# Patient Record
Sex: MALE | Race: BLACK OR AFRICAN AMERICAN | NOT HISPANIC OR LATINO
[De-identification: names, ages, dates, MRNs, and addresses within clinical notes are randomized per-mention and may not be internally consistent; named-entity substitution may affect disease eponyms.]

---

## 2022-02-15 PROBLEM — Z00.00 ENCOUNTER FOR PREVENTIVE HEALTH EXAMINATION: Status: ACTIVE | Noted: 2022-02-15

## 2022-02-17 ENCOUNTER — APPOINTMENT (OUTPATIENT)
Dept: UROLOGY | Facility: CLINIC | Age: 57
End: 2022-02-17
Payer: COMMERCIAL

## 2022-02-17 VITALS
BODY MASS INDEX: 29.41 KG/M2 | TEMPERATURE: 97.2 F | HEART RATE: 93 BPM | SYSTOLIC BLOOD PRESSURE: 130 MMHG | WEIGHT: 183 LBS | DIASTOLIC BLOOD PRESSURE: 88 MMHG | HEIGHT: 66 IN

## 2022-02-17 PROCEDURE — 99204 OFFICE O/P NEW MOD 45 MIN: CPT

## 2022-02-17 NOTE — HISTORY OF PRESENT ILLNESS
[FreeTextEntry1] : Dr. Addy Barreto\par [address] [address]\par \par \par Mr. Sylvester presents today for elevated PSA.  He is an overall healthy AA male with no comorbidities.  He does have a positive family history for prostate cancer.  His father and brother who are both alive and well were diagnosed with prostate cancer.  Mr. Sylvester is unsure of the treatment they received in regards to there CaP\par \par PSA trend:\par ~2 in 2020 (no records available)\par 4.48-1/24/22\par \par Does report nocturia x 3-4\par Occasional weak stream, frequency, occasional sense of incomplete emptying \par PVR today is 11 cc\par \par Surgical Hx: appendectomy 2021, left knee meniscus surgery 2011\par Social Hx: 1 cigarette/day, rare ETOH use, college , plays guitar, no children, \par Family Hx: Brother and father with CaP, both alive\par \par

## 2022-02-17 NOTE — PHYSICAL EXAM
[General Appearance - Well Developed] : well developed [General Appearance - Well Nourished] : well nourished [Normal Appearance] : normal appearance [Well Groomed] : well groomed [General Appearance - In No Acute Distress] : no acute distress [Edema] : no peripheral edema [] : no respiratory distress [Respiration, Rhythm And Depth] : normal respiratory rhythm and effort [Exaggerated Use Of Accessory Muscles For Inspiration] : no accessory muscle use [Abdomen Soft] : soft [Abdomen Tenderness] : non-tender [Costovertebral Angle Tenderness] : no ~M costovertebral angle tenderness [Urethral Meatus] : meatus normal [Penis Abnormality] : normal uncircumcised penis [Urinary Bladder Findings] : the bladder was normal on palpation [Scrotum] : the scrotum was normal [Epididymis] : the epididymides were normal [Testes Tenderness] : no tenderness of the testes [Testes Mass (___cm)] : there were no testicular masses [Prostate Tenderness] : the prostate was not tender [No Prostate Nodules] : no prostate nodules [No Focal Deficits] : no focal deficits [Oriented To Time, Place, And Person] : oriented to person, place, and time [Affect] : the affect was normal [Mood] : the mood was normal [Not Anxious] : not anxious [No Palpable Adenopathy] : no palpable adenopathy IV intact

## 2022-02-17 NOTE — ASSESSMENT
[FreeTextEntry1] : Elevated PSA\par normal exam\par repeat PSA to ensure veracity of result\par mpMRI \par \par LUTS\par -discussed behavioral modifications\par -discussed trial of Tamsulosin, will hold off for now until next follow up.\par \par RTC after pMRI

## 2022-02-18 LAB — PSA SERPL-MCNC: 5.27 NG/ML

## 2022-03-02 ENCOUNTER — NON-APPOINTMENT (OUTPATIENT)
Age: 57
End: 2022-03-02

## 2022-03-04 ENCOUNTER — APPOINTMENT (OUTPATIENT)
Dept: UROLOGY | Facility: CLINIC | Age: 57
End: 2022-03-04
Payer: COMMERCIAL

## 2022-03-04 DIAGNOSIS — Z87.891 PERSONAL HISTORY OF NICOTINE DEPENDENCE: ICD-10-CM

## 2022-03-04 DIAGNOSIS — Z80.42 FAMILY HISTORY OF MALIGNANT NEOPLASM OF PROSTATE: ICD-10-CM

## 2022-03-04 DIAGNOSIS — Z63.5 DISRUPTION OF FAMILY BY SEPARATION AND DIVORCE: ICD-10-CM

## 2022-03-04 DIAGNOSIS — H40.9 UNSPECIFIED GLAUCOMA: ICD-10-CM

## 2022-03-04 DIAGNOSIS — Z78.9 OTHER SPECIFIED HEALTH STATUS: ICD-10-CM

## 2022-03-04 PROCEDURE — 99214 OFFICE O/P EST MOD 30 MIN: CPT | Mod: 95

## 2022-03-04 RX ORDER — UBIDECARENONE/VIT E ACET 100MG-5
CAPSULE ORAL
Refills: 0 | Status: ACTIVE | COMMUNITY

## 2022-03-04 RX ORDER — ASCORBIC ACID 500 MG
TABLET ORAL
Refills: 0 | Status: ACTIVE | COMMUNITY

## 2022-03-04 RX ORDER — PNV NO.95/FERROUS FUM/FOLIC AC 28MG-0.8MG
TABLET ORAL
Refills: 0 | Status: ACTIVE | COMMUNITY

## 2022-03-04 SDOH — SOCIAL STABILITY - SOCIAL INSECURITY: DISRUPTION OF FAMILY BY SEPARATION AND DIVORCE: Z63.5

## 2022-03-04 NOTE — ASSESSMENT
[FreeTextEntry1] : 55 yo male with elevated PSA 5.27 ng/ml and MRI demonstrating two PIRADS 3 lesions (right mid TZa, right mid pzpm) with nonspecific subcentimeter LAD. Normal PSAD 0.08 ng/ml/cc, two prior negative standard biopsies, + family hx CaP in father and brother.  \par \par 1. Book for biopsy\par 2. Consider CT/PSMA due to nonspecific LAD post biopsy\par 3. Start Alfuzosin- he patient understands that this medication may cause dizziness, retrograde ejaculation or nasal congestion among other complications. I recommended that the patient take this medication at night. If the side effects become too bothersome, the medication can be discontinued.\par \par Follow up in person before biopsy for MRI review/physical exam\par \par IRB Notification\par \par The patient has been made aware of the opportunity to participate in our MR US fusion guided biopsy trial testing the next generation biopsy technology.  This is an IRB approved trial (IRB #).  He is already being consented for a standard MR US fusion guided biopsy therefore there is no change in his clinical work flow.  He has been given a copy of the consent to review before the biopsy date and given an opportunity to contact us with any further questions.  He will be consented on the day of the procedure or electronically before the biopsy.\par \par He understands that many men with prostate cancer will die with the disease rather than of it and we also discussed the results large multi-center American and  prostate cancer screening trials. He also understands that PSA in and of itself does not diagnose prostate cancer but only assesses risk to a certain degree. The patient understands that to definitively screen for prostate cancer, a biopsy is required and this procedure has risks, including bleeding, infection, ED and urinary retention. The patient opted to move forward with the biopsy.\par \par The patient is aware to expect hematuria x 2 weeks and up to 4 weeks of hematospermia.  There is a risk of infection albeit much lower than a transrectal approach. In some cases patients can experience erectile dysfunction but this is usually self limiting.  Any fever/chills after the biopsy the patient is to contact the office and go to the ER for an immediate evaluation. He has been given paper instructions outlining these items - which includes medications to avoid prior to surgery.\par \par 1. CBC, BMP, PSA, Covid Test, UA UCx EKG\par 2. Medical Clearance\par 3. TP biopsy at [facility]\par 4. follow up 2 weeks after biopsy with his primary urologist or ourselves.\par 5. we will call with the path results once they are resulted\par \par Dr Marti was in the office and available for consult during the entirety of this visit.

## 2022-03-04 NOTE — HISTORY OF PRESENT ILLNESS
[Home] : at home, [unfilled] , at the time of the visit. [Medical Office: ([facility])___] : at the medical office located in  [Verbal consent obtained from patient] : the patient, [unfilled] [FreeTextEntry1] : Dear CHERYL Fermin,\par \par Thank you so much for the referral to help care for your patient.\par \par Chief Complaint :Elevated PSA\par Date of first visit: 03/03/2022\par \par JUAN FRANK is a 56 year old Black man with PMHx asthma, glaucoma who presents for elevated PSA. His PSA is 5.27 ng/ml. MRI on 2/23/22 demonstrated two PIRADS 3 lesions (right mid TZa, right mid pzpm) with nonspecific subcentimeter LAD. His PSA density is normal (0.08 ng/ml/cc). He has had two prior negative biopsies (unsure of who performed them, "years ago"). He does have a + family hx of CaP in his father and brother. Both were diagnosed in their 50s/60s and he is unsure of any details surrounding diagnosis/treatment but both "caught it early" and are alive and well.\par \par Does experience some LUTS specifically frequency, weak FOS and nocturia x3. He has never tried medication and is interested in doing so. Denies dysuria, hematuria, feels he empties. Nighttime voids are not large volume.\par \par PSA Hx:\par 5.27 2/18/22. PSAD 0.08 ng/ml/cc\par 4.48 1/24/22\par \par MRI at [facility] on 2/23/2022.  62 cc prostate with PIRADS 3 lesion measuring 1.3cm, right mid TZa, PIRADS 3 lesion measuring 2.1cm, right mid pzpm.  Nonspecific subcentimeter pelvic and iliac LAD, No EPE, No Bony Lesions.  \par \par IPSS 10 QOL 4 on 2/17/22\par \par Prostate cancer screening: the patient and I spoke at length about prostate cancer screening, its risks and its benefits. The patient has 3 (age, PSA, race) risk factors for prostate cancer.  He understands that many men with prostate cancer will die with the disease rather than of it and we also discussed the results large multi-center American and  prostate cancer screening trials. He also understands that PSA in and of itself does not diagnose prostate cancer but only assesses risk to a certain degree. The patient understands that to definitively screen for prostate cancer, a biopsy is required and this procedure has risks, including bleeding, infection, ED and urinary retention. The patient opted to fusion biopsy.\par \par The patient denies fevers, chills, nausea and or vomiting and no unexplained weight loss.\par \par All pertinent laboratory, films and physician notes were reviewed.  Questionnaire results were discussed with patient.

## 2022-03-08 ENCOUNTER — TRANSCRIPTION ENCOUNTER (OUTPATIENT)
Age: 57
End: 2022-03-08

## 2022-03-14 ENCOUNTER — APPOINTMENT (OUTPATIENT)
Dept: UROLOGY | Facility: CLINIC | Age: 57
End: 2022-03-14
Payer: COMMERCIAL

## 2022-03-16 ENCOUNTER — APPOINTMENT (OUTPATIENT)
Dept: UROLOGY | Facility: CLINIC | Age: 57
End: 2022-03-16
Payer: COMMERCIAL

## 2022-04-04 ENCOUNTER — APPOINTMENT (OUTPATIENT)
Dept: UROLOGY | Facility: CLINIC | Age: 57
End: 2022-04-04
Payer: COMMERCIAL

## 2022-04-04 PROCEDURE — 99214 OFFICE O/P EST MOD 30 MIN: CPT | Mod: 95

## 2022-04-04 NOTE — ADDENDUM
[FreeTextEntry1] : IRB   MR/TRUS FUSION GUIDED PROSTATE BIOPSY- AN IMPROVED WAY TO DETECT AND QUANTIFY PROSTATE CANCER\par \par Inclusion criteria have been reviewed and it has been determined that the subject has met all inclusion criteria.\par Exclusion criteria have been reviewed and it has been determined that the subject does not meet any exclusion criteria.\par \par The following was discussed during the consent process:\par ·	The fact that the study involves research\par ·	The study schedule and procedures involved\par ·	The main risks of the study, and the fact that all risks may not be known at this time\par ·	New information that may affect the subject’s willingness to continue on the study will be presented as soon as it is available\par ·	Benefits of participating\par ·	Alternatives to participating\par ·	Confidentiality \par ·	Compensation for research-related injury\par ·	Contacts for questions about the study or their rights while on the study\par ·	The fact that the subject’s participation is voluntary - they can refuse or withdraw \par at any time without penalty or loss of benefits.\par \par The subject was given ample time to ask questions prior to signing - all questions were answered to the subject’s satisfaction.\par \par Contact information for research staff was given to the subject.	\par The subject has expressed his/her willingness to participate.	\par \par Opportunity to sign the consent electronically was offered to the subject. Study team to contact the subject to assist with e-consenting though the [employer] platform. \par \par OR  \par \par Subject will sign printed consent on day of procedure.  \par \par

## 2022-04-04 NOTE — ASSESSMENT
[FreeTextEntry1] : 55 yo male with elevated PSA 5.27 ng/ml and MRI demonstrating two PIRADS 3 lesions (right mid TZa, right mid pzpm) with nonspecific subcentimeter LAD. Normal PSAD 0.08 ng/ml/cc, two prior negative standard biopsies, + family hx CaP in father and brother.  \par \par 1. Book for biopsy\par 2. Consider CT/PSMA due to nonspecific LAD post biopsy\par 3. Start Alfuzosin- he patient understands that this medication may cause dizziness, retrograde ejaculation or nasal congestion among other complications. I recommended that the patient take this medication at night. If the side effects become too bothersome, the medication can be discontinued.\par \par Follow up in person before biopsy for MRI review/physical exam\par \par IRB Notification\par \par The patient has been made aware of the opportunity to participate in our MR US fusion guided biopsy trial testing the next generation biopsy technology.  This is an IRB approved trial (IRB #).  He is already being consented for a standard MR US fusion guided biopsy therefore there is no change in his clinical work flow.  He has been given a copy of the consent to review before the biopsy date and given an opportunity to contact us with any further questions.  He will be consented on the day of the procedure or electronically before the biopsy.\par \par He understands that many men with prostate cancer will die with the disease rather than of it and we also discussed the results large multi-center American and  prostate cancer screening trials. He also understands that PSA in and of itself does not diagnose prostate cancer but only assesses risk to a certain degree. The patient understands that to definitively screen for prostate cancer, a biopsy is required and this procedure has risks, including bleeding, infection, ED and urinary retention. The patient opted to move forward with the biopsy.\par \par The patient is aware to expect hematuria x 2 weeks and up to 4 weeks of hematospermia.  There is a risk of infection albeit much lower than a transrectal approach. In some cases patients can experience erectile dysfunction but this is usually self limiting.  Any fever/chills after the biopsy the patient is to contact the office and go to the ER for an immediate evaluation. He has been given paper instructions outlining these items - which includes medications to avoid prior to surgery.\par \par 1. CBC, BMP, PSA, Covid Test, UA UCx EKG\par 2. Medical Clearance\par 3. TP biopsy at [facility]\par 4. follow up 2 weeks after biopsy with his primary urologist or ourselves.\par 5. we will call with the path results once they are resulted\par \par Thank you very much for allowing me to assist in the care of this patient. Should you have any additional questions or concerns please do not hesitate to contact me.\par \par \par Sincerely,\par \par \par Morales Marti D.O.\par  of Urology and Radiology\par  of Urology at [facility]\par System Director for Prostate Cancer\par [address] [place] [address], 5th Floor [facility], [facility]\par Phone: [phone]\par

## 2022-04-04 NOTE — HISTORY OF PRESENT ILLNESS
[FreeTextEntry1] : Dear CHERYL Fermin,\par \par Thank you so much for the referral to help care for your patient.\par \par Chief Complaint: Elevated PSA\par Date of first visit: 03/03/2022\par \par JUAN FRANK is a 56 year old Black man with PMHx asthma, glaucoma who presents for elevated PSA. His PSA is 5.27 ng/ml. MRI on 2/23/22 demonstrated two PIRADS 3 lesions (right mid TZa, right mid pzpm) with nonspecific subcentimeter LAD. His PSA density is normal (0.08 ng/ml/cc). He has had two prior negative biopsies (unsure of who performed them, "years ago"). He does have a + family hx of CaP in his father and brother. Both were diagnosed in their 50s/60s and he is unsure of any details surrounding diagnosis/treatment but both "caught it early" and are alive and well.\par \par Does experience some LUTS specifically frequency, weak FOS and nocturia x3. He has never tried medication and is interested in doing so. Denies dysuria, hematuria, feels he empties. Nighttime voids are not large volume.\par \par PSA Hx:\par 5.27 2/18/22. PSAD 0.08 ng/ml/cc\par 4.48 1/24/22\par \par MRI Hx\par MRI at [facility] on 2/23/2022.  62 cc prostate with PIRADS 3 lesion measuring 1.3cm, right mid TZa, PIRADS 3 lesion measuring 2.1cm, right mid pzpm.  Nonspecific subcentimeter pelvic and iliac LAD, No EPE, No Bony Lesions. The images have been reviewed and clinical implications discussed with the patient.\par \par After review i added a right apex TZa nodule.  very high signal on b-2000\par \par 03/16/2022\par IPSS    QOL\par RAMILA\par \par IPSS 10 QOL 4 on 2/17/22\par \par Prostate cancer screening: the patient and I spoke at length about prostate cancer screening, its risks and its benefits. The patient has 3 (age, PSA, race) risk factors for prostate cancer.  He understands that many men with prostate cancer will die with the disease rather than of it and we also discussed the results large multi-center American and  prostate cancer screening trials. He also understands that PSA in and of itself does not diagnose prostate cancer but only assesses risk to a certain degree. The patient understands that to definitively screen for prostate cancer, a biopsy is required and this procedure has risks, including bleeding, infection, ED and urinary retention. The patient opted to fusion biopsy.\par \par The patient denies fevers, chills, nausea and or vomiting and no unexplained weight loss.\par \par All pertinent laboratory, films and physician notes were reviewed.  Questionnaire results were discussed with patient.\par \par I spent 31 minutes of non-face to face time reviewing the patient's records, chart, uploading processing MR images, transferring MR images from PACS to an independent workstation, reviewing images on independent workstation, speaking with their physician and planning their prostate biopsy and preparation of an upcoming visit for 04/04/2022 .  The imaging and planning was highly complex and took this entire time. \par \par \par

## 2022-04-04 NOTE — PHYSICAL EXAM
[General Appearance - Well Developed] : well developed [General Appearance - Well Nourished] : well nourished [] : no respiratory distress

## 2022-04-06 ENCOUNTER — RESULT REVIEW (OUTPATIENT)
Age: 57
End: 2022-04-06

## 2022-04-06 ENCOUNTER — TRANSCRIPTION ENCOUNTER (OUTPATIENT)
Age: 57
End: 2022-04-06

## 2022-04-06 NOTE — ASU PATIENT PROFILE, ADULT - NSICDXPASTMEDICALHX_GEN_ALL_CORE_FT
PAST MEDICAL HISTORY:  Asthma     Prostate cancer      PAST MEDICAL HISTORY:  Asthma     Elevated PSA

## 2022-04-07 ENCOUNTER — TRANSCRIPTION ENCOUNTER (OUTPATIENT)
Age: 57
End: 2022-04-07

## 2022-04-07 ENCOUNTER — OUTPATIENT (OUTPATIENT)
Dept: OUTPATIENT SERVICES | Facility: HOSPITAL | Age: 57
LOS: 1 days | Discharge: HOME | End: 2022-04-07
Payer: COMMERCIAL

## 2022-04-07 ENCOUNTER — APPOINTMENT (OUTPATIENT)
Dept: UROLOGY | Facility: AMBULATORY SURGERY CENTER | Age: 57
End: 2022-04-07

## 2022-04-07 VITALS
SYSTOLIC BLOOD PRESSURE: 117 MMHG | HEIGHT: 66 IN | OXYGEN SATURATION: 98 % | DIASTOLIC BLOOD PRESSURE: 86 MMHG | RESPIRATION RATE: 18 BRPM | WEIGHT: 180.34 LBS | HEART RATE: 59 BPM | TEMPERATURE: 98 F

## 2022-04-07 VITALS
TEMPERATURE: 98 F | SYSTOLIC BLOOD PRESSURE: 132 MMHG | OXYGEN SATURATION: 98 % | RESPIRATION RATE: 16 BRPM | DIASTOLIC BLOOD PRESSURE: 84 MMHG | HEART RATE: 72 BPM

## 2022-04-07 DIAGNOSIS — Z98.890 OTHER SPECIFIED POSTPROCEDURAL STATES: Chronic | ICD-10-CM

## 2022-04-07 DIAGNOSIS — Z90.49 ACQUIRED ABSENCE OF OTHER SPECIFIED PARTS OF DIGESTIVE TRACT: Chronic | ICD-10-CM

## 2022-04-07 PROCEDURE — 55706 BX PRST8 NDL SAT SAMPLING: CPT | Mod: 22

## 2022-04-07 PROCEDURE — 76872 US TRANSRECTAL: CPT | Mod: 26

## 2022-04-07 PROCEDURE — 88305 TISSUE EXAM BY PATHOLOGIST: CPT | Mod: 26

## 2022-04-07 RX ORDER — OXYCODONE HYDROCHLORIDE 5 MG/1
5 TABLET ORAL ONCE
Refills: 0 | Status: DISCONTINUED | OUTPATIENT
Start: 2022-04-07 | End: 2022-04-07

## 2022-04-07 RX ORDER — SODIUM CHLORIDE 9 MG/ML
500 INJECTION, SOLUTION INTRAVENOUS
Refills: 0 | Status: DISCONTINUED | OUTPATIENT
Start: 2022-04-07 | End: 2022-04-22

## 2022-04-07 RX ORDER — HYDROMORPHONE HYDROCHLORIDE 2 MG/ML
0.25 INJECTION INTRAMUSCULAR; INTRAVENOUS; SUBCUTANEOUS
Refills: 0 | Status: DISCONTINUED | OUTPATIENT
Start: 2022-04-07 | End: 2022-04-07

## 2022-04-07 RX ORDER — ONDANSETRON 8 MG/1
4 TABLET, FILM COATED ORAL ONCE
Refills: 0 | Status: DISCONTINUED | OUTPATIENT
Start: 2022-04-07 | End: 2022-04-22

## 2022-04-07 RX ORDER — LIDOCAINE HCL 20 MG/ML
5 VIAL (ML) INJECTION ONCE
Refills: 0 | Status: COMPLETED | OUTPATIENT
Start: 2022-04-07 | End: 2022-04-07

## 2022-04-07 RX ORDER — ACETAMINOPHEN 500 MG
650 TABLET ORAL ONCE
Refills: 0 | Status: DISCONTINUED | OUTPATIENT
Start: 2022-04-07 | End: 2022-04-22

## 2022-04-07 RX ADMIN — OXYCODONE HYDROCHLORIDE 5 MILLIGRAM(S): 5 TABLET ORAL at 17:15

## 2022-04-07 RX ADMIN — Medication 5 MILLILITER(S): at 20:20

## 2022-04-07 RX ADMIN — OXYCODONE HYDROCHLORIDE 5 MILLIGRAM(S): 5 TABLET ORAL at 16:45

## 2022-04-07 NOTE — ASU DISCHARGE PLAN (ADULT/PEDIATRIC) - NS MD DC FALL RISK RISK
For information on Fall & Injury Prevention, visit: [email or web address] OR  [email or web address] OR  https://www.cdc.gov/steadi/patient.html

## 2022-04-07 NOTE — BRIEF OPERATIVE NOTE - NSICDXBRIEFPROCEDURE_GEN_ALL_CORE_FT
PROCEDURES:  Transperineal template-guided mapping biopsy of prostate 07-Apr-2022 15:10:39  Chanda Tong

## 2022-04-08 ENCOUNTER — APPOINTMENT (OUTPATIENT)
Dept: UROLOGY | Facility: CLINIC | Age: 57
End: 2022-04-08
Payer: COMMERCIAL

## 2022-04-08 VITALS — TEMPERATURE: 98.1 F | SYSTOLIC BLOOD PRESSURE: 153 MMHG | HEART RATE: 76 BPM | DIASTOLIC BLOOD PRESSURE: 90 MMHG

## 2022-04-08 VITALS — TEMPERATURE: 97.6 F | DIASTOLIC BLOOD PRESSURE: 83 MMHG | HEART RATE: 83 BPM | SYSTOLIC BLOOD PRESSURE: 141 MMHG

## 2022-04-08 PROBLEM — J45.909 UNSPECIFIED ASTHMA, UNCOMPLICATED: Chronic | Status: ACTIVE | Noted: 2022-04-07

## 2022-04-08 PROBLEM — R97.20 ELEVATED PROSTATE SPECIFIC ANTIGEN [PSA]: Chronic | Status: ACTIVE | Noted: 2022-04-07

## 2022-04-08 PROCEDURE — 51798 US URINE CAPACITY MEASURE: CPT

## 2022-04-08 PROCEDURE — 99024 POSTOP FOLLOW-UP VISIT: CPT

## 2022-04-08 NOTE — ASSESSMENT
[FreeTextEntry1] : 57 yo male who presents for TOV s/p prostate fusion biopsy on 4/7/22.\par \par 1. Passed TOV\par 2. Continue alfuzosin\par 3. Advised on return precautions and s/sx of urinary retention\par \par 2 week follow up with Dr Chamorro

## 2022-04-08 NOTE — PHYSICAL EXAM
[General Appearance - Well Developed] : well developed [General Appearance - Well Nourished] : well nourished [Normal Appearance] : normal appearance [Well Groomed] : well groomed [General Appearance - In No Acute Distress] : no acute distress [Abdomen Soft] : soft [Abdomen Tenderness] : non-tender [Costovertebral Angle Tenderness] : no ~M costovertebral angle tenderness [Urethral Meatus] : meatus normal [Urinary Bladder Findings] : the bladder was normal on palpation [Scrotum] : the scrotum was normal [Edema] : no peripheral edema [] : no respiratory distress [Respiration, Rhythm And Depth] : normal respiratory rhythm and effort [Exaggerated Use Of Accessory Muscles For Inspiration] : no accessory muscle use [Oriented To Time, Place, And Person] : oriented to person, place, and time [Affect] : the affect was normal [Mood] : the mood was normal [Normal Station and Gait] : the gait and station were normal for the patient's age

## 2022-04-08 NOTE — HISTORY OF PRESENT ILLNESS
[FreeTextEntry1] : 57 yo male who presents for TOV s/p prostate fusion biopsy on 4/7/22. Did not take alfuzosin yesterday or today.\par \par Catheter draining clear yellow urine\par 100cc sterile water instilled- could not tolerate\par Voided 50cc, PVR 49cc\par Drank 10oz coffee, 16oz water\par Voided x4 320cc- FOS progressively became stronger with each void\par \par Voided again 100cc strong flow\par

## 2022-04-09 LAB — SURGICAL PATHOLOGY STUDY: SIGNIFICANT CHANGE UP

## 2022-04-11 ENCOUNTER — NON-APPOINTMENT (OUTPATIENT)
Age: 57
End: 2022-04-11

## 2022-04-13 DIAGNOSIS — R97.20 ELEVATED PROSTATE SPECIFIC ANTIGEN [PSA]: ICD-10-CM

## 2022-04-13 DIAGNOSIS — N42.31 PROSTATIC INTRAEPITHELIAL NEOPLASIA: ICD-10-CM

## 2022-04-13 DIAGNOSIS — Z91.013 ALLERGY TO SEAFOOD: ICD-10-CM

## 2022-04-13 DIAGNOSIS — J45.909 UNSPECIFIED ASTHMA, UNCOMPLICATED: ICD-10-CM

## 2022-04-28 ENCOUNTER — APPOINTMENT (OUTPATIENT)
Dept: UROLOGY | Facility: CLINIC | Age: 57
End: 2022-04-28

## 2022-05-10 ENCOUNTER — APPOINTMENT (OUTPATIENT)
Dept: UROLOGY | Facility: CLINIC | Age: 57
End: 2022-05-10
Payer: COMMERCIAL

## 2022-05-10 VITALS
OXYGEN SATURATION: 96 % | HEART RATE: 62 BPM | TEMPERATURE: 97.5 F | SYSTOLIC BLOOD PRESSURE: 125 MMHG | DIASTOLIC BLOOD PRESSURE: 92 MMHG

## 2022-05-10 PROCEDURE — 99213 OFFICE O/P EST LOW 20 MIN: CPT

## 2022-05-10 NOTE — HISTORY OF PRESENT ILLNESS
Chronic issue.  Was always prediabetic until fall 2020.  Not checking home blood sugars.  Has lost 2 lbs.  Unfortunately has been physically inactive x a few mo b/c of worsening low back pain that just improved with nerve ablation.    Avoiding white carbs except white rice.  Eats while wheat toast, oatmeal, beans, whole wheat wraps,protein, vegetables.     [FreeTextEntry1] : CC: elevated PSA\par \par PIRAD4 on MRI \par Biopsy negative\par \par Plan for PSA at 6 and 12 months, with MRI at 12 months given PIRAD4\par \par No biopsy issues other than post-biopsy need for catheter for 24 hours. \par Voiding more normally now\par On alpha blocker\par \par PVR \par \par Feeling well \par No infection/fever \par

## 2022-07-12 NOTE — ASU DISCHARGE PLAN (ADULT/PEDIATRIC) - CARE PROVIDER_API CALL
INFECTIOUS DISEASE CLINIC  2022     Subjective:      Chief Complaint: Pretravel clinic    History of Present Illness:  82-year-old male with HTN, DM here for pretravel evaluation.    Going to [facility] on  with lady friend, arriving in [facility].  Flying back .  Has a 7-hour layover in [place].  Set up with travel company [email or web address].    Activities/itinerary seem open-ended.  However, he does NOT plan to explore caves or go on extensive hikes, have direct contact with animals, have sexual activities with locals, receive accupuncture or tattoos.    Reports NKDA.    Review of Systems   All other systems reviewed and are negative.        Past Medical History:   Diagnosis Date    Cancer     Diabetes mellitus, type 2     Hypertension      Past Surgical History:   Procedure Laterality Date    FINGER SURGERY  3/2014    HERNIA REPAIR  1967    KNEE SURGERY      SPINE SURGERY  10/2007    VASECTOMY  1986     Family History   Problem Relation Age of Onset    Hypertension Mother     Hyperlipidemia Mother     Hypertension Father     Hyperlipidemia Father      Social History     Tobacco Use    Smoking status: Former Smoker     Packs/day: 0.25     Years: 0.10     Pack years: 0.02     Types: Cigarettes     Start date:      Quit date:      Years since quittin.5    Smokeless tobacco: Never Used   Substance Use Topics    Alcohol use: Yes     Comment: special occacion    Drug use: No       Review of patient's allergies indicates:   Allergen Reactions    Grass pollen- grass standard Itching     Eye irritation         Objective:   VS (24h):   Vitals:    22 0951   BP: (!) 158/71   Pulse: 71   Temp: 98.5 °F (36.9 °C)         Physical Exam  Vitals reviewed.   Constitutional:       General: He is not in acute distress.     Appearance: He is normal weight. He is not ill-appearing, toxic-appearing or diaphoretic.   HENT:      Head: Normocephalic and atraumatic.      Right  Ear: External ear normal.      Left Ear: External ear normal.      Nose: Nose normal.   Eyes:      Extraocular Movements: Extraocular movements intact.      Conjunctiva/sclera: Conjunctivae normal.   Cardiovascular:      Rate and Rhythm: Normal rate.   Pulmonary:      Effort: Pulmonary effort is normal. No respiratory distress.   Abdominal:      General: Abdomen is flat.      Palpations: Abdomen is soft.   Musculoskeletal:         General: Normal range of motion.      Cervical back: Normal range of motion.   Skin:     General: Skin is warm and dry.   Neurological:      Mental Status: He is alert and oriented to person, place, and time. Mental status is at baseline.   Psychiatric:         Mood and Affect: Mood normal.         Behavior: Behavior normal.         Thought Content: Thought content normal.         Judgment: Judgment normal.           Labs:  Reviewed    Micro:   Reviewed    Radiology:   Reviewed    Immunization History   Administered Date(s) Administered    COVID-19, MRNA, LN-S, PF (Pfizer) (Gray Cap) 05/12/2022    COVID-19, MRNA, LN-S, PF (Pfizer) (Purple Cap) 01/14/2021, 02/05/2021, 10/25/2021    Influenza (FLUAD) - Quadrivalent - Adjuvanted - PF *Preferred* (65+) 11/02/2020, 10/25/2021    Influenza - High Dose - PF (65 years and older) 11/13/2014, 11/13/2014, 10/29/2015, 10/19/2016, 10/01/2018, 10/14/2019    Pneumococcal Conjugate - 13 Valent 09/21/2017    Pneumococcal Polysaccharide - 23 Valent 04/23/2015    Td - PF (ADULT) 01/02/2019    Typhoid - ViCPs 07/12/2022    Zoster Recombinant 08/12/2019, 10/14/2019         Assessment & Plan:     1. Travel advice encounter  - Typhoid Vaccine (ViCPs) (IM); Future  - atovaquone-proguaniL (MALARONE) 250-100 mg Tab; Take 1 tablet by mouth once daily. For malaria prophylaxis.  Starting taking 2 days before leaving.  Take everyday during your trip.  Take for an additional 7 days after returning. for 19 days  Dispense: 19 tablet; Refill: 0  - azithromycin  (ZITHROMAX) 500 MG tablet; Take 2 tablets (1,000 mg total) by mouth once daily. 1000mg (2 TABS) BY MOUTH AS A SINGLE DOSE AS NEEDED FOR TRAVELER'S DIARRHEA  Dispense: 4 tablet; Refill: 0       This patient was provided with an extensive travel guidance packet which provides travel information specific to the patients itinerary.     The patient's medical history was reviewed and the patient was counseled on:  -Dietary precautions.  -Personal protective measures to prevent insect-borne diseases (e.g., malaria, dengue).  -Precautions to prevent exposure to rabies and seek treatment for possible exposures.  -Precautions against sun exposure.  -Personal and travel safety.    Vaccinations:  The patient's immunization history was reviewed and, based on the patient's itinerary, the following immunizations were ordered:  - Typhoid IM    - Due to patient's age and risk for YEL-AND and YEL-AVD, discussed that I highlyt recommend against administration of this vaccine.  The yellow fever vaccine was not administered.    - Recommended influenza, Hepatitis A, and meningococcal vaccine series be obtained from PCP.    Traveler's diarrhea:  Azithromycin 1000mg/episode was ordered for treatment if severe or bloody diarrhea develops and the patient was instructed on use and possible side effects.    Malaria:  Atovaquone-proguanil 250-100 mg PO qdaily, start 2 days before expected exposure and end 7 days after last day of exposure.    (Based on Cockcroft-Gault equation and 3/2/22 labs CrCl is 53, or 46 using AdjBW).    The patient was instructed to contact us about any problems that may develop after travel.    Manpreet Ardon MD  Infectious Disease Fellow         Alexis Marti (DO)  Urology  [address], 5th Floor [facility], [place] [address]  Phone: [phone]  Fax: [phone]  Follow Up Time: 1 week

## 2022-07-25 ENCOUNTER — APPOINTMENT (OUTPATIENT)
Dept: OPHTHALMOLOGY | Facility: CLINIC | Age: 57
End: 2022-07-25

## 2022-07-25 ENCOUNTER — NON-APPOINTMENT (OUTPATIENT)
Age: 57
End: 2022-07-25

## 2022-08-02 ENCOUNTER — NON-APPOINTMENT (OUTPATIENT)
Age: 57
End: 2022-08-02

## 2022-08-02 ENCOUNTER — APPOINTMENT (OUTPATIENT)
Dept: OPHTHALMOLOGY | Facility: CLINIC | Age: 57
End: 2022-08-02

## 2022-08-02 PROCEDURE — 92012 INTRM OPH EXAM EST PATIENT: CPT

## 2022-08-08 ENCOUNTER — APPOINTMENT (OUTPATIENT)
Dept: OTOLARYNGOLOGY | Facility: CLINIC | Age: 57
End: 2022-08-08
Payer: COMMERCIAL

## 2022-08-08 VITALS — BODY MASS INDEX: 28.61 KG/M2 | WEIGHT: 178 LBS | HEIGHT: 66 IN | TEMPERATURE: 98 F

## 2022-08-08 DIAGNOSIS — H91.93 UNSPECIFIED HEARING LOSS, BILATERAL: ICD-10-CM

## 2022-08-08 DIAGNOSIS — H90.5 UNSPECIFIED SENSORINEURAL HEARING LOSS: ICD-10-CM

## 2022-08-08 DIAGNOSIS — Z78.9 OTHER SPECIFIED HEALTH STATUS: ICD-10-CM

## 2022-08-08 DIAGNOSIS — H90.3 SENSORINEURAL HEARING LOSS, BILATERAL: ICD-10-CM

## 2022-08-08 DIAGNOSIS — H61.21 IMPACTED CERUMEN, RIGHT EAR: ICD-10-CM

## 2022-08-08 PROCEDURE — 92567 TYMPANOMETRY: CPT

## 2022-08-08 PROCEDURE — 99203 OFFICE O/P NEW LOW 30 MIN: CPT | Mod: 25

## 2022-08-08 PROCEDURE — 92557 COMPREHENSIVE HEARING TEST: CPT

## 2022-08-08 PROCEDURE — 69210 REMOVE IMPACTED EAR WAX UNI: CPT

## 2022-08-08 NOTE — ASSESSMENT
[FreeTextEntry1] : He had cerumen impaction on the right side which was removed.  Audiogram showed an asymmetric left high-frequency sensorineural hearing loss\par \par PLAN\par \par -findings and management options discussed in detail with the patient. \par -good aural hygiene\par -avoid using cotton swabs in the ears\par -wax removal drops as needed. \par -noise precautions\par -annual audiogram\par --I discussed the possibility of retrocochlear pathology causing the asymmetric hearing loss.  The options are observation with serial audiograms, ABR, and MRI to rule out retrocochlear pathology. The patient has opted for an MRI.\par -follow up after the MRI\par -call and return earlier if any concerns.

## 2022-08-08 NOTE — HISTORY OF PRESENT ILLNESS
[de-identified] : JUAN FRANK is a 56 year old patient referred by Dr. Barreto for evaluation for hearing loss.  He said that he has had more difficulty hearing with background noise.  He denies otalgia, otorrhea, tinnitus, or dizziness.  He does not have a known history of recurrent middle ear infections, prior otologic surgery, or ear/head trauma.  He did have noise exposure from music in the past.  There is no family history of hearing loss.  He has not had a recent audiogram

## 2022-08-08 NOTE — CONSULT LETTER
[Dear  ___] : Dear  [unfilled], [Consult Letter:] : I had the pleasure of evaluating your patient, [unfilled]. [Please see my note below.] : Please see my note below. [Consult Closing:] : Thank you very much for allowing me to participate in the care of this patient.  If you have any questions, please do not hesitate to contact me. [Sincerely,] : Sincerely, [FreeTextEntry3] : Jie Casillas MD\par

## 2022-08-30 ENCOUNTER — APPOINTMENT (OUTPATIENT)
Dept: OPHTHALMOLOGY | Facility: CLINIC | Age: 57
End: 2022-08-30

## 2022-08-30 ENCOUNTER — NON-APPOINTMENT (OUTPATIENT)
Age: 57
End: 2022-08-30

## 2022-08-30 PROCEDURE — 92012 INTRM OPH EXAM EST PATIENT: CPT

## 2022-08-30 PROCEDURE — 92083 EXTENDED VISUAL FIELD XM: CPT

## 2022-09-30 ENCOUNTER — APPOINTMENT (OUTPATIENT)
Dept: OPHTHALMOLOGY | Facility: CLINIC | Age: 57
End: 2022-09-30

## 2022-09-30 ENCOUNTER — NON-APPOINTMENT (OUTPATIENT)
Age: 57
End: 2022-09-30

## 2022-09-30 PROCEDURE — 92083 EXTENDED VISUAL FIELD XM: CPT

## 2022-09-30 PROCEDURE — 92012 INTRM OPH EXAM EST PATIENT: CPT

## 2022-11-15 NOTE — ASU PATIENT PROFILE, ADULT - NS PREOP UNDERSTANDS INFO
spoke to patient to be NPO after  3 am tonight, can drink clear liquids up to 6 am , bring ID photo, insurance and vaccination cards,, escort arranged, no smoking, no drinking,  dress comfortable, address and telephone given to patient/yes

## 2022-11-15 NOTE — ASU PATIENT PROFILE, ADULT - FALL HARM RISK - UNIVERSAL INTERVENTIONS
Bed in lowest position, wheels locked, appropriate side rails in place/Call bell, personal items and telephone in reach/Instruct patient to call for assistance before getting out of bed or chair/Non-slip footwear when patient is out of bed/[place] to call system/Physically safe environment - no spills, clutter or unnecessary equipment/Purposeful Proactive Rounding/Room/bathroom lighting operational, light cord in reach

## 2022-11-15 NOTE — ASU PATIENT PROFILE, ADULT - VISION (WITH CORRECTIVE LENSES IF THE PATIENT USUALLY WEARS THEM):
HX of glaucoma/Partially impaired: cannot see medication labels or newsprint, but can see obstacles in path, and the surrounding layout; can count fingers at arm's length

## 2022-11-15 NOTE — ASU PATIENT PROFILE, ADULT - NSICDXPASTMEDICALHX_GEN_ALL_CORE_FT
PAST MEDICAL HISTORY:  Asthma     Elevated PSA      PAST MEDICAL HISTORY:  Asthma     BPH (benign prostatic hyperplasia)     Elevated PSA     Pneumonia

## 2022-11-16 ENCOUNTER — NON-APPOINTMENT (OUTPATIENT)
Age: 57
End: 2022-11-16

## 2022-11-16 ENCOUNTER — TRANSCRIPTION ENCOUNTER (OUTPATIENT)
Age: 57
End: 2022-11-16

## 2022-11-16 ENCOUNTER — OUTPATIENT (OUTPATIENT)
Dept: OUTPATIENT SERVICES | Facility: HOSPITAL | Age: 57
LOS: 1 days | Discharge: HOME | End: 2022-11-16

## 2022-11-16 ENCOUNTER — APPOINTMENT (OUTPATIENT)
Dept: OPHTHALMOLOGY | Facility: AMBULATORY SURGERY CENTER | Age: 57
End: 2022-11-16

## 2022-11-16 VITALS
DIASTOLIC BLOOD PRESSURE: 73 MMHG | HEART RATE: 62 BPM | SYSTOLIC BLOOD PRESSURE: 109 MMHG | TEMPERATURE: 98 F | RESPIRATION RATE: 16 BRPM | OXYGEN SATURATION: 99 %

## 2022-11-16 VITALS
TEMPERATURE: 98 F | WEIGHT: 166.45 LBS | HEART RATE: 60 BPM | RESPIRATION RATE: 14 BRPM | OXYGEN SATURATION: 99 % | HEIGHT: 66 IN | DIASTOLIC BLOOD PRESSURE: 82 MMHG | SYSTOLIC BLOOD PRESSURE: 114 MMHG

## 2022-11-16 DIAGNOSIS — Z98.890 OTHER SPECIFIED POSTPROCEDURAL STATES: Chronic | ICD-10-CM

## 2022-11-16 DIAGNOSIS — Z90.49 ACQUIRED ABSENCE OF OTHER SPECIFIED PARTS OF DIGESTIVE TRACT: Chronic | ICD-10-CM

## 2022-11-16 PROCEDURE — 66180 AQUEOUS SHUNT EYE W/GRAFT: CPT | Mod: LT

## 2022-11-16 DEVICE — SHUNT GLAUCOMA BAERVELDT: Type: IMPLANTABLE DEVICE | Site: LEFT EYE | Status: FUNCTIONAL

## 2022-11-16 RX ORDER — TAMSULOSIN HYDROCHLORIDE 0.4 MG/1
1 CAPSULE ORAL
Qty: 0 | Refills: 0 | DISCHARGE

## 2022-11-16 RX ORDER — OFLOXACIN 0.3 %
1 DROPS OPHTHALMIC (EYE)
Refills: 0 | Status: COMPLETED | OUTPATIENT
Start: 2022-11-16 | End: 2022-11-16

## 2022-11-16 RX ORDER — ACETAMINOPHEN 500 MG
650 TABLET ORAL ONCE
Refills: 0 | Status: COMPLETED | OUTPATIENT
Start: 2022-11-16 | End: 2022-11-16

## 2022-11-16 RX ORDER — ALBUTEROL 90 UG/1
2 AEROSOL, METERED ORAL
Qty: 0 | Refills: 0 | DISCHARGE

## 2022-11-16 RX ADMIN — Medication 650 MILLIGRAM(S): at 16:32

## 2022-11-16 RX ADMIN — Medication 1 DROP(S): at 13:18

## 2022-11-16 RX ADMIN — Medication 1 DROP(S): at 13:13

## 2022-11-16 RX ADMIN — Medication 1 DROP(S): at 13:23

## 2022-11-16 NOTE — PRE-ANESTHESIA EVALUATION ADULT - NSANTHOSAYNRD_GEN_A_CORE
No. MILAGRO screening performed.  STOP BANG Legend: 0-2 = LOW Risk; 3-4 = INTERMEDIATE Risk; 5-8 = HIGH Risk

## 2022-11-16 NOTE — PRE-ANESTHESIA EVALUATION ADULT - NSANTHADDINFOFT_GEN_ALL_CORE
Pt given Albuterol 2 Puffs OCTOR.  For conscious sedation Pt. accepts awareness to sight, sound, touch, pressure and memory of procedure.

## 2022-11-16 NOTE — ASU DISCHARGE PLAN (ADULT/PEDIATRIC) - NS MD DC FALL RISK RISK
For information on Fall & Injury Prevention, visit: [email or web address] OR  [email or web address] OR  https://www.cdc.gov/steadi/patient.html

## 2022-11-16 NOTE — OPERATIVE REPORT - OPERATIVE RPOSRT DETAILS
Patient Name: JUAN FRANK    Medical Record Number: [id number]    DATE OF SURGERY: NOVEMBER 16, 2022    OPERATING SURGEON: HUMAIRA SOL M.D.    ASSISTANT SURGEON: LEXII JENKINS M.D.    ANESTHESIA: MONITORED ANESTHESIA CARE AND SUBCONJUNCTIVAL INJECTION.    PREOPERATIVE DIAGNOSIS: GLAUCOMA, LEFT EYE    POSTOPERATIVE DIAGNOSIS: GLAUCOMA, LEFT EYE    OPERATIVE PROCEDURE: BAERVELDT GLAUCOMA IMPLANT, CORNEAL PATCH GRAFT, MITOMYCIN C, LEFT EYE.    COMPLICATIONS: NONE.    SPECIMEN: NONE.    ESTIMATED BLOOD LOSS: <1 cc    PATIENT CONDITION: STABLE.    PROCEDURE:   Prior to the procedure, all risks, benefits and alternatives were discussed with the patient, including but not limited to infection, bleeding, retinal detachment, increase or decrease in intraocular pressure, corneal edema, corneal decompensation, ptosis, diplopia, loss of vision, no improvement of vision, need for second surgery, macular edema, intraocular inflammation, etc. All questions were answered and the patient wished to proceed with the surgery. Informed consent was obtained.    The patient was wheeled to the operating room and placed on the operating table in a supine position. Next, the left eye was prepped and draped in the usual sterile fashion for intraocular surgery. An eyelid speculum was placed into the left eye.    A 7-0 silk traction suture was placed through the cornea and the eye was rotated superiorly. Subconjunctival and subtenon lidocaine was then injected into the inferonasal quadrant. An inferonasal peritomy was then created with Ted scissors and clot forceps. The area was carefully dissected posteriorly. A Baerveldt glaucoma implant 350 was primed with balanced salt solution. One 3-0 Prolene suture and one 6-0 Prolene suture were inserted as rip cords into the proximal opening of the tube. The tube was ligated tightly together with the two rip cord sutures, using two 7-0 Vicryl sutures. Inferior and nasal rectus muscles were isolated using a muscle hook and a cotton-tipped applicator. The plate was then placed 8 mm posterior to the limbus and sutured down with two interrupted 8-0 Nylon sutures.    The tube was then trimmed to an appropriate length. Using a 23 gauge needle, a sclerostomy was created with an external opening at 2 mm posterior to the limbus. The tube was then placed into the anterior chamber through the sclerostomy. It was noted to be in a good position. The tube was then sutured to the sclera using three interrupted 9-0 Nylon sutures. A corneal patch graft was sutured over the tube with two interrupted 8-0 Vicryl sutures. Three fenestrations were made across the tube using the needle of a 10-0 Vicryl suture for intraocular pressure control during the early postoperative period. After appropriate conjunctivoplasty, the conjunctiva was then reapproximated with interrupted 8-0 Vicryl sutures. Mitomycin C 15 micrograms was injected into the Tenon’s capsule over the tube plate.    At the end of the procedure, the anterior chamber was well formed. The intraocular pressure was in the mid-teens by palpation. The tube was in a good position and the conjunctival wound was water tight. Cefazolin and dexamethasone were applied on the cornea and conjunctiva. The eyelid speculum was removed. Topical antibiotic and steroid ointment was placed onto the left eye, which was then patched and shielded. The patient was wheeled to the recovery room in a stable and excellent condition.

## 2022-11-17 ENCOUNTER — NON-APPOINTMENT (OUTPATIENT)
Age: 57
End: 2022-11-17

## 2022-11-17 ENCOUNTER — APPOINTMENT (OUTPATIENT)
Dept: OPHTHALMOLOGY | Facility: CLINIC | Age: 57
End: 2022-11-17

## 2022-11-17 PROBLEM — J18.9 PNEUMONIA, UNSPECIFIED ORGANISM: Chronic | Status: ACTIVE | Noted: 2022-11-16

## 2022-11-17 PROBLEM — N40.0 BENIGN PROSTATIC HYPERPLASIA WITHOUT LOWER URINARY TRACT SYMPTOMS: Chronic | Status: ACTIVE | Noted: 2022-11-16

## 2022-11-17 PROCEDURE — 99024 POSTOP FOLLOW-UP VISIT: CPT

## 2022-12-02 NOTE — ASU DISCHARGE PLAN (ADULT/PEDIATRIC) - CLICK TO LAUNCH ORM
.
Wide local excision with a sentinel lymph node biopsy    What to expect  1. You will have dermabond covering your incision(s). This is skin glue and creates an artificial scar over the wound. You may have 1-2 incisions depending on the location of your tumor.  2. A small amount of bruising is normal after surgery.  3. If the blue dye was used during the surgery for the sentinel lymph node biopsy, the skin may be blue, as well as parts of your eyes and your urine, for the first 24-48 hours post op.    Wound Care  1. You may shower after 48 hrs after surgery.  2. When showering, do not try to scrub the incisions. Do not soak the incision (baths/swimming/hot tubs) for 2 weeks.  3. The dermabond will start to come off after about 2 weeks. Do not try to pick off the edges that have loosened.  4. You should wear a sports bra or more supportive bra day and night for 1-2 weeks after surgery. This will help with your post-operative pain.    Restrictions  1. It is always beneficial to ambulate regularly after surgery  2. Avoid heavy lifting and excessive use of the affected arm (such as weight lifting) for the first two weeks  3. Avoid bras/clothing that directly put pressure on the incision  4. Most home medications can be restarted the day after surgery (24 hours post-op).  5. Do NOT make important decisions or drive while on opioid pain medications    Pain control  1. For pain control, you can alternate between Tylenol and Motrin.   2. Please take the pain medication with food to decrease GI upset  3. If requiring stronger medications, please call the office to speak with a practitioner    Diet  No Restrictions    When to call us:  1. if you develop fevers or chills  2. if the incisions become red, tender and warm to touch  3. if you notice purulent drainage from either incision  4. if you notice that your chest wall is becoming "puffy" or filled with fluid after surgery    Follow up care  1. follow up appointment is usually 1-2 weeks after surgery  2. You will receive final pathology and further instructions during the first follow up visit.

## 2022-12-09 ENCOUNTER — NON-APPOINTMENT (OUTPATIENT)
Age: 57
End: 2022-12-09

## 2022-12-09 ENCOUNTER — APPOINTMENT (OUTPATIENT)
Dept: OPHTHALMOLOGY | Facility: CLINIC | Age: 57
End: 2022-12-09

## 2022-12-09 PROCEDURE — 99024 POSTOP FOLLOW-UP VISIT: CPT

## 2023-01-20 ENCOUNTER — NON-APPOINTMENT (OUTPATIENT)
Age: 58
End: 2023-01-20

## 2023-01-20 ENCOUNTER — APPOINTMENT (OUTPATIENT)
Dept: OPHTHALMOLOGY | Facility: CLINIC | Age: 58
End: 2023-01-20
Payer: COMMERCIAL

## 2023-01-20 PROCEDURE — 99024 POSTOP FOLLOW-UP VISIT: CPT

## 2023-03-06 ENCOUNTER — RX RENEWAL (OUTPATIENT)
Age: 58
End: 2023-03-06

## 2023-04-25 ENCOUNTER — APPOINTMENT (OUTPATIENT)
Dept: MRI IMAGING | Facility: CLINIC | Age: 58
End: 2023-04-25
Payer: COMMERCIAL

## 2023-04-25 ENCOUNTER — OUTPATIENT (OUTPATIENT)
Dept: OUTPATIENT SERVICES | Facility: HOSPITAL | Age: 58
LOS: 1 days | End: 2023-04-25

## 2023-04-25 DIAGNOSIS — Z98.890 OTHER SPECIFIED POSTPROCEDURAL STATES: Chronic | ICD-10-CM

## 2023-04-25 DIAGNOSIS — Z90.49 ACQUIRED ABSENCE OF OTHER SPECIFIED PARTS OF DIGESTIVE TRACT: Chronic | ICD-10-CM

## 2023-04-25 PROCEDURE — 72197 MRI PELVIS W/O & W/DYE: CPT | Mod: 26

## 2023-04-26 ENCOUNTER — NON-APPOINTMENT (OUTPATIENT)
Age: 58
End: 2023-04-26

## 2023-05-11 ENCOUNTER — APPOINTMENT (OUTPATIENT)
Dept: RADIOLOGY | Facility: CLINIC | Age: 58
End: 2023-05-11

## 2023-05-11 ENCOUNTER — OUTPATIENT (OUTPATIENT)
Dept: OUTPATIENT SERVICES | Facility: HOSPITAL | Age: 58
LOS: 1 days | End: 2023-05-11
Payer: COMMERCIAL

## 2023-05-11 DIAGNOSIS — Z98.890 OTHER SPECIFIED POSTPROCEDURAL STATES: Chronic | ICD-10-CM

## 2023-05-11 DIAGNOSIS — Z90.49 ACQUIRED ABSENCE OF OTHER SPECIFIED PARTS OF DIGESTIVE TRACT: Chronic | ICD-10-CM

## 2023-05-11 PROCEDURE — 71046 X-RAY EXAM CHEST 2 VIEWS: CPT | Mod: 26

## 2023-05-13 ENCOUNTER — OUTPATIENT (OUTPATIENT)
Dept: OUTPATIENT SERVICES | Facility: HOSPITAL | Age: 58
LOS: 1 days | End: 2023-05-13

## 2023-05-13 ENCOUNTER — APPOINTMENT (OUTPATIENT)
Dept: CT IMAGING | Facility: CLINIC | Age: 58
End: 2023-05-13
Payer: COMMERCIAL

## 2023-05-13 DIAGNOSIS — Z90.49 ACQUIRED ABSENCE OF OTHER SPECIFIED PARTS OF DIGESTIVE TRACT: Chronic | ICD-10-CM

## 2023-05-13 DIAGNOSIS — Z98.890 OTHER SPECIFIED POSTPROCEDURAL STATES: Chronic | ICD-10-CM

## 2023-05-13 PROCEDURE — 71250 CT THORAX DX C-: CPT | Mod: 26

## 2023-05-16 ENCOUNTER — APPOINTMENT (OUTPATIENT)
Dept: UROLOGY | Facility: CLINIC | Age: 58
End: 2023-05-16

## 2023-05-23 ENCOUNTER — APPOINTMENT (OUTPATIENT)
Dept: UROLOGY | Facility: CLINIC | Age: 58
End: 2023-05-23
Payer: COMMERCIAL

## 2023-05-23 VITALS
DIASTOLIC BLOOD PRESSURE: 69 MMHG | BODY MASS INDEX: 27.8 KG/M2 | HEIGHT: 66 IN | OXYGEN SATURATION: 98 % | SYSTOLIC BLOOD PRESSURE: 109 MMHG | TEMPERATURE: 97.8 F | WEIGHT: 173 LBS | HEART RATE: 75 BPM

## 2023-05-23 PROCEDURE — 99213 OFFICE O/P EST LOW 20 MIN: CPT

## 2023-05-23 NOTE — HISTORY OF PRESENT ILLNESS
[FreeTextEntry1] : Heavenly Laurent MD, MPH\par [facility] Internal Medicine at [address]\par \par \par Mr. Sylvester presents today with CC of elevated PSA and LUTS\par Alfuzosin helpful \par MRI 4/23 PIRAD2\par Last year 4/2022 biopsy negative \par \par \par \par Surgical Hx: appendectomy 2021, left knee meniscus surgery 2011\par Social Hx: 1 cigarette/day, rare ETOH use, college , plays [employer], no children, \par Family Hx: Brother and father with CaP, both alive\par \par

## 2023-05-23 NOTE — ASSESSMENT
[FreeTextEntry1] : Diagnosis: \par Elevated PSA \par LUTS\par \par Plan \par Alfuzosin\par PSA today \par

## 2023-05-31 LAB — PSA SERPL-MCNC: 9.05 NG/ML

## 2023-06-09 ENCOUNTER — NON-APPOINTMENT (OUTPATIENT)
Age: 58
End: 2023-06-09

## 2023-06-09 ENCOUNTER — APPOINTMENT (OUTPATIENT)
Dept: OPHTHALMOLOGY | Facility: CLINIC | Age: 58
End: 2023-06-09
Payer: COMMERCIAL

## 2023-06-09 PROCEDURE — 92083 EXTENDED VISUAL FIELD XM: CPT

## 2023-06-09 PROCEDURE — 92014 COMPRE OPH EXAM EST PT 1/>: CPT

## 2023-06-23 ENCOUNTER — NON-APPOINTMENT (OUTPATIENT)
Age: 58
End: 2023-06-23

## 2023-06-23 ENCOUNTER — LABORATORY RESULT (OUTPATIENT)
Age: 58
End: 2023-06-23

## 2023-06-23 ENCOUNTER — APPOINTMENT (OUTPATIENT)
Dept: PULMONOLOGY | Facility: CLINIC | Age: 58
End: 2023-06-23
Payer: COMMERCIAL

## 2023-06-23 VITALS
BODY MASS INDEX: 28.61 KG/M2 | OXYGEN SATURATION: 98 % | RESPIRATION RATE: 12 BRPM | SYSTOLIC BLOOD PRESSURE: 115 MMHG | DIASTOLIC BLOOD PRESSURE: 79 MMHG | HEART RATE: 66 BPM | WEIGHT: 178 LBS | TEMPERATURE: 97.7 F | HEIGHT: 66 IN

## 2023-06-23 DIAGNOSIS — R09.82 POSTNASAL DRIP: ICD-10-CM

## 2023-06-23 PROCEDURE — 99204 OFFICE O/P NEW MOD 45 MIN: CPT

## 2023-06-23 RX ORDER — MOMETASONE 50 UG/1
50 SPRAY, METERED NASAL TWICE DAILY
Qty: 1 | Refills: 3 | Status: ACTIVE | COMMUNITY
Start: 2023-06-23 | End: 1900-01-01

## 2023-06-23 RX ORDER — FLUTICASONE PROPIONATE 50 UG/1
50 SPRAY, METERED NASAL TWICE DAILY
Qty: 1 | Refills: 5 | Status: ACTIVE | COMMUNITY
Start: 2023-06-23 | End: 1900-01-01

## 2023-06-23 NOTE — DISCUSSION/SUMMARY
[FreeTextEntry1] : Reviewed:\par Chest XR 5/11/23: slight increased lung markings in the posterior basal segment of the left lower lobe\par CT chest 5/13/23:  Multifocal regions of peribronchial parenchymal bandlike and ground-glass opacity, including within the left lower lobe lingula and left perihilar region. There is tree-in-bud branching micronodular opacity within the subpleural left lower lobe (image 147) with similar findings present to a lesser extent within the left lower lobe (image 175). There is a region of scarring within the right upper lobe extending from the right perihilar region. No pulmonary consolidation. No pulmonary mass.\par Records from PCP Dr. Laurent \par \par The patient is a 57 year old M, former smoker, with PMHx of Asthma, BPH, Glaucoma, who was referred by Dr. Laurent for cough and abnormal CT chest.\par \par A/P: Etiology of cough likely multifactorial from PND, GERD, Asthma, Bronchiectasis. Currently with dry cough, no sputum production. Notes 4 URIs in the past 6 months for which he was treated with multiple rounds of antibiotics, one URI treated with steroids. No hospitalizations. Patient describes URIs as beginning with sinus pressure/congestion, then developing cough with SOB/Chest tightness. May have element of sinusitis driving frequent URIs. \par \par (1) Bronchiectasis: Patient with evidence of bronchiectasis on CT chest 5/23. Notes frequent episodes of bronchitis/PNA in childhood which may be etiology. Also with GERD which may be contributory. No hemoptysis, weight loss, sputum production or indication to treat bronchiectasis currently. \par \par (2) Asthma, Allergic rhinitis: Currently using Atrovent sparingly. \par - Continue Advair with gargle \par - Continue Atrovent prn \par - Start Nasonex nasal spray BID (fluticasone not covered by insurance)\par - Will obtain PFTs, Asthma Profile, CBC w/ Diff, and IgE for further evaluation. Needs ,Aspergillus Ag and Ab to r/o ABPA.\par \par Follow-up in 2 weeks for PFTs. \par

## 2023-06-23 NOTE — REVIEW OF SYSTEMS
[Cough] : cough [Nasal Congestion] : nasal congestion [Sinus Problems] : sinus problems [Chest Tightness] : chest tightness [Wheezing] : wheezing [Fever] : no fever [Fatigue] : no fatigue [Recent Wt Gain (___ Lbs)] : ~T no recent weight gain [Chills] : no chills [Poor Appetite] : no poor appetite [Recent Wt Loss (___ Lbs)] : ~T no recent weight loss [Dry Eyes] : no dry eyes [Eye Irritation] : no eye irritation [Hemoptysis] : no hemoptysis [Sputum] : no sputum [Dyspnea] : no dyspnea [Pleuritic Pain] : no pleuritic pain [SOB on Exertion] : no sob on exertion [Chest Discomfort] : no chest discomfort [Edema] : no edema [Orthopnea] : no orthopnea [Seasonal Allergies] : no seasonal allergies [GERD] : no gerd

## 2023-06-23 NOTE — HISTORY OF PRESENT ILLNESS
[Former] : former [TextBox_4] : The patient is a 57 year old M, former smoker, with PMHx of Asthma, BPH, Glaucoma, who was referred by Dr. Laurent for cough and abnormal CT chest. Cough is dry, not worse in morning/night. Notes that in the past 6 months he's had 4 URIs for which he was prescribed 4 rounds of antibiotics as well as one course of prednisone (during most recent URI x 5 weeks ago). During URI's has sinus congestion, cough, as well as worsening of his asthma with SOB/wheezing. Taking Advair 250-50 1 puff BID and Atrovent. Now using Atrovent infrequently.  Endorses PND, nasal congestion, as well as seasonal allergies. No pets. No known mold exposures. Has GERD. States he had bronchitis/ PNA frequently in his childhood/20s.\par  [TextBox_11] : 1 [TextBox_13] : 1 [YearQuit] : 2022

## 2023-06-23 NOTE — REASON FOR VISIT
[Initial] : an initial visit [Abnormal CXR/ Chest CT] : an abnormal CXR/ chest CT [Cough] : cough [TextBox_13] :  Dr. Laurent

## 2023-06-23 NOTE — PHYSICAL EXAM
[No Acute Distress] : no acute distress [Normal Appearance] : normal appearance [No Neck Mass] : no neck mass [Normal Rate/Rhythm] : normal rate/rhythm [Normal S1, S2] : normal s1, s2 [No Murmurs] : no murmurs [No Resp Distress] : no resp distress [Clear to Auscultation Bilaterally] : clear to auscultation bilaterally [No Abnormalities] : no abnormalities [Benign] : benign [Normal Gait] : normal gait [No Clubbing] : no clubbing [No Cyanosis] : no cyanosis [No Edema] : no edema [FROM] : FROM [Normal Color/ Pigmentation] : normal color/ pigmentation [No Focal Deficits] : no focal deficits [Oriented x3] : oriented x3 [Normal Affect] : normal affect [Erythema] : erythema [Nasal congestion] : nasal congestion [Turbinate hypertrophy] : turbinate hypertrophy

## 2023-06-26 ENCOUNTER — NON-APPOINTMENT (OUTPATIENT)
Age: 58
End: 2023-06-26

## 2023-06-26 LAB
A ALTERNATA IGE QN: 2.86 KUA/L
A FUMIGATUS IGE QN: 0.72 KUA/L
BASOPHILS # BLD AUTO: 0.06 K/UL
BASOPHILS NFR BLD AUTO: 1.5 %
C ALBICANS IGE QN: 3.15 KUA/L
C HERBARUM IGE QN: 0.1 KUA/L
CAT DANDER IGE QN: 6.2 KUA/L
COMMON RAGWEED IGE QN: 1.9 KUA/L
D FARINAE IGE QN: 11 KUA/L
D PTERONYSS IGE QN: 0.76 KUA/L
DEPRECATED A ALTERNATA IGE RAST QL: 2
DEPRECATED A FUMIGATUS IGE RAST QL: 2
DEPRECATED C ALBICANS IGE RAST QL: 2
DEPRECATED C HERBARUM IGE RAST QL: NORMAL
DEPRECATED CAT DANDER IGE RAST QL: 3
DEPRECATED COMMON RAGWEED IGE RAST QL: 2
DEPRECATED D FARINAE IGE RAST QL: 3
DEPRECATED D PTERONYSS IGE RAST QL: 2
DEPRECATED DOG DANDER IGE RAST QL: 2
DEPRECATED M RACEMOSUS IGE RAST QL: NORMAL
DEPRECATED ROACH IGE RAST QL: NORMAL
DEPRECATED TIMOTHY IGE RAST QL: 2
DEPRECATED WHITE OAK IGE RAST QL: NORMAL
DOG DANDER IGE QN: 2.03 KUA/L
EOSINOPHIL # BLD AUTO: 0.21 K/UL
EOSINOPHIL NFR BLD AUTO: 5.4 %
HCT VFR BLD CALC: 48.7 %
HGB BLD-MCNC: 15.6 G/DL
IMM GRANULOCYTES NFR BLD AUTO: 0.3 %
LYMPHOCYTES # BLD AUTO: 1.79 K/UL
LYMPHOCYTES NFR BLD AUTO: 45.7 %
M RACEMOSUS IGE QN: 0.1 KUA/L
MAN DIFF?: NORMAL
MCHC RBC-ENTMCNC: 31 PG
MCHC RBC-ENTMCNC: 32 GM/DL
MCV RBC AUTO: 96.6 FL
MONOCYTES # BLD AUTO: 0.55 K/UL
MONOCYTES NFR BLD AUTO: 14 %
NEUTROPHILS # BLD AUTO: 1.3 K/UL
NEUTROPHILS NFR BLD AUTO: 33.1 %
PLATELET # BLD AUTO: 213 K/UL
RBC # BLD: 5.04 M/UL
RBC # FLD: 13.6 %
ROACH IGE QN: 0.2 KUA/L
TIMOTHY IGE QN: 0.75 KUA/L
TOTAL IGE SMQN RAST: 280 KU/L
WBC # FLD AUTO: 3.92 K/UL
WHITE OAK IGE QN: 0.23 KUA/L

## 2023-06-27 ENCOUNTER — NON-APPOINTMENT (OUTPATIENT)
Age: 58
End: 2023-06-27

## 2023-06-27 LAB — GALACTOMANNAN AG SERPL QL IA: 0.02 INDEX

## 2023-06-29 ENCOUNTER — NON-APPOINTMENT (OUTPATIENT)
Age: 58
End: 2023-06-29

## 2023-06-29 LAB
A FLAVUS AB FLD QL: NEGATIVE
A FUMIGATUS AB FLD QL: NEGATIVE
A NIGER AB FLD QL: NEGATIVE

## 2023-07-03 ENCOUNTER — APPOINTMENT (OUTPATIENT)
Dept: UROLOGY | Facility: CLINIC | Age: 58
End: 2023-07-03
Payer: COMMERCIAL

## 2023-07-03 VITALS
WEIGHT: 178 LBS | SYSTOLIC BLOOD PRESSURE: 102 MMHG | BODY MASS INDEX: 28.61 KG/M2 | HEIGHT: 66 IN | TEMPERATURE: 97.7 F | OXYGEN SATURATION: 96 % | DIASTOLIC BLOOD PRESSURE: 70 MMHG | HEART RATE: 84 BPM

## 2023-07-03 DIAGNOSIS — N46.9 MALE INFERTILITY, UNSPECIFIED: ICD-10-CM

## 2023-07-03 PROCEDURE — 99214 OFFICE O/P EST MOD 30 MIN: CPT

## 2023-07-03 RX ORDER — OMEPRAZOLE 40 MG/1
40 CAPSULE, DELAYED RELEASE ORAL
Qty: 30 | Refills: 0 | Status: ACTIVE | COMMUNITY
Start: 2023-06-27

## 2023-07-03 RX ORDER — CLARITHROMYCIN 500 MG/1
500 TABLET, FILM COATED ORAL
Qty: 28 | Refills: 0 | Status: ACTIVE | COMMUNITY
Start: 2023-06-27

## 2023-07-03 RX ORDER — DORZOLAMIDE HYDROCHLORIDE TIMOLOL MALEATE 20; 5 MG/ML; MG/ML
22.3-6.8 SOLUTION/ DROPS OPHTHALMIC
Qty: 10 | Refills: 0 | Status: ACTIVE | COMMUNITY
Start: 2023-06-28

## 2023-07-03 RX ORDER — AMOXICILLIN 500 MG/1
500 TABLET, FILM COATED ORAL
Qty: 56 | Refills: 0 | Status: ACTIVE | COMMUNITY
Start: 2023-06-27

## 2023-07-03 RX ORDER — ALBUTEROL SULFATE 90 UG/1
108 (90 BASE) INHALANT RESPIRATORY (INHALATION)
Qty: 7 | Refills: 0 | Status: ACTIVE | COMMUNITY
Start: 2023-05-25

## 2023-07-03 NOTE — ADDENDUM
[FreeTextEntry1] : I, Dr. Marti, personally performed the evaluation and management (E/M) services for this established patient who presents today with (a) new problem(s)/exacerbation of (an) existing condition(s).  That E/M includes conducting the examination, assessing all new/exacerbated conditions, and establishing a new plan of care.  Today, my ACP, Harriett Salas, was here to observe my evaluation and management services for this new problem/exacerbated condition to be followed going forward.\par

## 2023-07-03 NOTE — ASSESSMENT
[FreeTextEntry1] : 56 yo male with elevated PSA, stable MRI, elevated PSAD 0.19 ng/ml/cc, three prior negative biopsies, + family hx CaP in father and brother, neg CYDNEY.\par \par 1. MRI reviewed - On review, the prior lesions discussed on 2022 scan are still present.\par 2. Select MDx\par 3. Repeat PSA with UA, UCX - confirm elevation as it is an outlier. He did ride the bike this morning and ejaculate yesterday. Consider repeating if still elevated.\par 4. Continue alfuzosin\par 5. He has been trying to conceive with his partner x 1 year. Semen analysis ordered\par \par Follow up 1 month to discuss results- MDx and SA\par \par Thank you very much for allowing me to assist in the care of this patient. Should you have any additional questions or concerns please do not hesitate to contact me.\par \par \par Sincerely,\par \par \par Morales Marti D.O.\par Professor of Urology and Radiology\par  of Urology at [facility]\par System Director for Prostate Cancer\par [address] [place] [address], 5th Floor [facility], [facility]\par Phone: [phone]\par

## 2023-07-03 NOTE — PHYSICAL EXAM
[Prostate Tenderness] : the prostate was not tender [No Prostate Nodules] : no prostate nodules [General Appearance - Well Developed] : well developed [General Appearance - Well Nourished] : well nourished [Normal Appearance] : normal appearance [Well Groomed] : well groomed [General Appearance - In No Acute Distress] : no acute distress [Costovertebral Angle Tenderness] : no ~M costovertebral angle tenderness [Urethral Meatus] : meatus normal [Penis Abnormality] : normal circumcised penis [Urinary Bladder Findings] : the bladder was normal on palpation [Scrotum] : the scrotum was normal [Testes Tenderness] : no tenderness of the testes [Testes Mass (___cm)] : there were no testicular masses [Edema] : no peripheral edema [] : no respiratory distress [Respiration, Rhythm And Depth] : normal respiratory rhythm and effort [Exaggerated Use Of Accessory Muscles For Inspiration] : no accessory muscle use [Oriented To Time, Place, And Person] : oriented to person, place, and time [Affect] : the affect was normal [Mood] : the mood was normal [Not Anxious] : not anxious [Normal Station and Gait] : the gait and station were normal for the patient's age [No Focal Deficits] : no focal deficits

## 2023-07-03 NOTE — HISTORY OF PRESENT ILLNESS
[FreeTextEntry1] : Dear CHERYL Fermin,\par \par Thank you so much for the referral to help care for your patient.\par \par Chief Complaint: Elevated PSA\par Date of first visit: 03/03/2022\par \par JUAN FRANK is a 57 year old Black man with PMHx asthma, glaucoma who presents for elevated PSA. His PSA is 9.05 ng/ml. MRI on 4/25/23 was read as negative. His PSA density is elevated (0.19 ng/ml/cc). He has had three prior negative biopsies (fusion 2022 with Dr Marti, unsure of the others which were "years ago"). He does have a + family hx of CaP in his father and brother. Both were diagnosed in their 50s/60s and he is unsure of any details surrounding diagnosis/treatment but both "caught it early" and are alive and well.\par \par Experiencing LUTS on alfuzosin. Denies dysuria, hematuria, feels he empties. \par \par PSA Hx:\par 9.05   05/23/23. PSAD 0.19 ng/ml/cc\par 5.27 2/18/22\par 4.48 1/24/22\par \par MRI Hx\par MRI read 04/25/2023 at [facility]. 46.5 cc prostate with PIRADS 2 no MRI tragetable lesion. No LAD No EPE, No Bony Lesions.  The images have been reviewed and clinical implications discussed with the patient. \par \par MRI at [facility] on 2/23/2022.  62 cc prostate with PIRADS 3 lesion measuring 1.3cm, right mid TZa, PIRADS 3 lesion measuring 2.1cm, right mid pzpm.  Nonspecific subcentimeter pelvic and iliac LAD, No EPE, No Bony Lesions. The images have been reviewed and clinical implications discussed with the patient.\par \par After review i added a right apex TZa nodule.  very high signal on b-2000\par \par Biopsy Hx\par 4/7/22- with Dr Marti- negative\par LPA- HGPIN\par right mid pzpm- HGPIN\par \par 07/03/2023\par IPSS  9  QOL  3\par RAMILA  22\par \par IPSS 10 QOL 4 on 2/17/22\par \par Prostate cancer screening: the patient and I spoke at length about prostate cancer screening, its risks and its benefits. The patient has 3 (age, PSA, race) risk factors for prostate cancer.  He understands that many men with prostate cancer will die with the disease rather than of it and we also discussed the results large multi-center American and  prostate cancer screening trials. He also understands that PSA in and of itself does not diagnose prostate cancer but only assesses risk to a certain degree. The patient understands that to definitively screen for prostate cancer, a biopsy is required and this procedure has risks, including bleeding, infection, ED and urinary retention. The patient opted to select MDx.\par \par The patient denies fevers, chills, nausea and or vomiting and no unexplained weight loss.\par \par All pertinent laboratory, films and physician notes were reviewed.  Questionnaire results were discussed with patient.\par \par I spent 31 minutes of non-face to face time reviewing the patient's records, chart, uploading processing MR images, transferring MR images from PACS to an independent workstation, reviewing images on independent workstation, speaking with their physician and planning their prostate biopsy and preparation of an upcoming visit for 07/03/2023 .  The imaging and planning was highly complex and took this entire time. \par \par we reviewed prior lesions and appreciated those as well on the current scan

## 2023-07-05 ENCOUNTER — TRANSCRIPTION ENCOUNTER (OUTPATIENT)
Age: 58
End: 2023-07-05

## 2023-07-05 LAB
APPEARANCE: CLEAR
BACTERIA: NEGATIVE /HPF
BILIRUBIN URINE: NEGATIVE
BLOOD URINE: NEGATIVE
CAST: 4 /LPF
COLOR: NORMAL
EPITHELIAL CELLS: 0 /HPF
GLUCOSE QUALITATIVE U: NEGATIVE MG/DL
KETONES URINE: ABNORMAL MG/DL
LEUKOCYTE ESTERASE URINE: ABNORMAL
MICROSCOPIC-UA: NORMAL
NITRITE URINE: NEGATIVE
PH URINE: 6.5
PROTEIN URINE: NORMAL MG/DL
PSA FREE FLD-MCNC: 14 %
PSA FREE SERPL-MCNC: 0.99 NG/ML
PSA SERPL-MCNC: 7.11 NG/ML
RED BLOOD CELLS URINE: 0 /HPF
SPECIFIC GRAVITY URINE: 1.02
UROBILINOGEN URINE: 1 MG/DL
WHITE BLOOD CELLS URINE: 3 /HPF

## 2023-07-06 LAB — BACTERIA UR CULT: NORMAL

## 2023-07-06 NOTE — PHYSICAL EXAM
[No Acute Distress] : no acute distress [Normal Oropharynx] : normal oropharynx [Normal Appearance] : normal appearance [No Neck Mass] : no neck mass [Normal Rate/Rhythm] : normal rate/rhythm [Normal S1, S2] : normal s1, s2 [No Resp Distress] : no resp distress [Clear to Auscultation Bilaterally] : clear to auscultation bilaterally [No Clubbing] : no clubbing [No Edema] : no edema [Oriented x3] : oriented x3 [Normal Affect] : normal affect

## 2023-07-11 ENCOUNTER — APPOINTMENT (OUTPATIENT)
Dept: PULMONOLOGY | Facility: CLINIC | Age: 58
End: 2023-07-11
Payer: COMMERCIAL

## 2023-07-11 ENCOUNTER — NON-APPOINTMENT (OUTPATIENT)
Age: 58
End: 2023-07-11

## 2023-07-11 VITALS
BODY MASS INDEX: 28.61 KG/M2 | SYSTOLIC BLOOD PRESSURE: 102 MMHG | HEIGHT: 66 IN | HEART RATE: 75 BPM | OXYGEN SATURATION: 97 % | TEMPERATURE: 97.6 F | WEIGHT: 178 LBS | DIASTOLIC BLOOD PRESSURE: 70 MMHG

## 2023-07-11 PROCEDURE — ZZZZZ: CPT

## 2023-07-11 PROCEDURE — 94618 PULMONARY STRESS TESTING: CPT

## 2023-07-11 PROCEDURE — 94729 DIFFUSING CAPACITY: CPT

## 2023-07-11 PROCEDURE — 99214 OFFICE O/P EST MOD 30 MIN: CPT | Mod: 25

## 2023-07-11 PROCEDURE — 94727 GAS DIL/WSHOT DETER LNG VOL: CPT

## 2023-07-11 PROCEDURE — 94060 EVALUATION OF WHEEZING: CPT

## 2023-07-11 RX ORDER — FLUTICASONE PROPIONATE AND SALMETEROL 250; 50 UG/1; UG/1
250-50 POWDER RESPIRATORY (INHALATION)
Qty: 60 | Refills: 0 | Status: ACTIVE | COMMUNITY
Start: 2023-07-11 | End: 1900-01-01

## 2023-07-12 NOTE — DISCUSSION/SUMMARY
[FreeTextEntry1] : The patient is a 57 year old M, former smoker, with PMHx of Asthma, BPH, Glaucoma, who was referred by Dr. Laurent for cough and abnormal CT chest.\par \par Reviewed:\par \par Chest XR 5/11/23: slight increased lung markings in the posterior basal segment of the left lower lobe\par CT chest 5/13/23: Multifocal regions of peribronchial parenchymal bandlike and ground-glass opacity, including within the left lower lobe lingula and left perihilar region. There is tree-in-bud branching micronodular opacity within the subpleural left lower lobe. There is a region of scarring within the right upper lobe extending from the right perihilar region. No pulmonary consolidation. No pulmonary mass.\par Records from PCP Dr. Laurent \par \par Labs 6/23: Asthma Profile + multiple allergens, no eosinophilia, IgE 280, Aspergillus Ag and Ab negative\par \par PFT (7/11/23): FEV 1 82, FEV1/FVC 68  , TLC 89 , DLCO 76 , REV 1\par \par A/P:\par \par (1) Bronchiectasis: Patient with evidence of bronchiectasis on CT chest 5/23. Notes frequent episodes of bronchitis/PNA in childhood which may be etiology. Also with GERD which may be contributory. No hemoptysis, weight loss, sputum production or indication to treat bronchiectasis currently. \par \par (2) Asthma, Allergic rhinitis: Currently using Atrovent sparingly. No laboratory evidence of ABPA. Asthma profile +  multiple allergens. PFTs today with mild obstruction. Symptoms well-controlled with Advair and Nasonex. \par - Continue Advair with gargle \par - Continue Atrovent PRN\par - Continue Nasonex nasal spray BID (fluticasone not covered by insurance)\par \par Follow-up in 6 months with Dr. Park. \par

## 2023-07-12 NOTE — REVIEW OF SYSTEMS
[Nasal Congestion] : nasal congestion [Sinus Problems] : sinus problems [Fever] : no fever [Chills] : no chills [Dry Eyes] : no dry eyes [Eye Irritation] : no eye irritation [Cough] : no cough [Sputum] : no sputum [Chest Discomfort] : no chest discomfort [Edema] : no edema [Orthopnea] : no orthopnea [Hay Fever] : no hay fever [GERD] : no gerd

## 2023-07-12 NOTE — HISTORY OF PRESENT ILLNESS
[Former] : former [TextBox_4] : The patient is a 57 year old M, former smoker, with PMHx of Asthma, BPH, Glaucoma, who was referred by Dr. Laurent for cough and abnormal CT chest. Cough is dry, not worse in morning/night. Notes that in the past 6 months he's had 4 URIs for which he was prescribed 4 rounds of antibiotics as well as one course of prednisone (during most recent URI x 5 weeks ago). During URI's has sinus congestion, cough, as well as worsening of his asthma with SOB/wheezing. Taking Advair 250-50 1 puff BID and Atrovent. Now using Atrovent infrequently. Endorses PND, nasal congestion, as well as seasonal allergies. No pets. No known mold exposures. Has GERD. States he had bronchitis/ PNA frequently in his childhood/20s.\par \par 7/11/23:\par Cough improved with use of Nasonex nasal spray. Asthma symptoms well-controlled with Advair. \par \par  [TextBox_11] : 1 [TextBox_13] : 1 [YearQuit] : 2022

## 2023-08-07 ENCOUNTER — APPOINTMENT (OUTPATIENT)
Dept: UROLOGY | Facility: CLINIC | Age: 58
End: 2023-08-07
Payer: COMMERCIAL

## 2023-08-07 ENCOUNTER — OUTPATIENT (OUTPATIENT)
Dept: OUTPATIENT SERVICES | Facility: HOSPITAL | Age: 58
LOS: 1 days | End: 2023-08-07
Payer: COMMERCIAL

## 2023-08-07 VITALS
HEIGHT: 66 IN | HEART RATE: 62 BPM | OXYGEN SATURATION: 99 % | SYSTOLIC BLOOD PRESSURE: 111 MMHG | WEIGHT: 174 LBS | TEMPERATURE: 98.2 F | DIASTOLIC BLOOD PRESSURE: 75 MMHG | BODY MASS INDEX: 27.97 KG/M2

## 2023-08-07 DIAGNOSIS — Z98.890 OTHER SPECIFIED POSTPROCEDURAL STATES: Chronic | ICD-10-CM

## 2023-08-07 DIAGNOSIS — Z90.49 ACQUIRED ABSENCE OF OTHER SPECIFIED PARTS OF DIGESTIVE TRACT: Chronic | ICD-10-CM

## 2023-08-07 PROCEDURE — 99214 OFFICE O/P EST MOD 30 MIN: CPT

## 2023-08-07 PROCEDURE — 71046 X-RAY EXAM CHEST 2 VIEWS: CPT

## 2023-08-07 PROCEDURE — 71046 X-RAY EXAM CHEST 2 VIEWS: CPT | Mod: 26

## 2023-08-07 NOTE — HISTORY OF PRESENT ILLNESS
[FreeTextEntry1] : Dear CHERYL Fermin,  Thank you so much for the referral to help care for your patient.  Chief Complaint: Elevated PSA Date of first visit: 03/03/2022  JUAN FRANK is a 57 year old Black man with PMHx asthma, glaucoma who presents for elevated PSA. His PSA is 9.05 ng/ml. MRI on 4/25/23 was read as negative. His PSA density is elevated (0.15 ng/ml/cc). He has had three prior negative biopsies (fusion 2022 with Dr Marti, unsure of the others which were "years ago"). He does have a + family hx of CaP in his father and brother. Both were diagnosed in their 50s/60s and he is unsure of any details surrounding diagnosis/treatment but both "caught it early" and are alive and well.  Experiencing LUTS on alfuzosin. Denies dysuria, hematuria, feels he empties.   07/03/2023 [facility]. Indicates a 53% likelihood of detecting prostate cancer, with a 25% probability for GS > 7, when performing a standard 12-core TRUS guided biopsy.  PSA Hx: 7.11    07/03/23 9.05    05/23/23. PSAD 0.19 ng/ml/cc 5.27    02/18/22 4.48    01/24/22  MRI Hx MRI read 04/25/2023 at [facility]. 46.5 cc prostate with PIRADS 2 no MRI tragetable lesion. No LAD No EPE, No Bony Lesions.  The images have been reviewed and clinical implications discussed with the patient. we reviewed prior lesions and appreciated those as well on the current scan  MRI at [facility] on 2/23/2022.  62 cc prostate with PIRADS 3 lesion measuring 1.3cm, right mid TZa, PIRADS 3 lesion measuring 2.1cm, right mid pzpm.  Nonspecific subcentimeter pelvic and iliac LAD, No EPE, No Bony Lesions. The images have been reviewed and clinical implications discussed with the patient.  After review i added a right apex TZa nodule.  very high signal on b-2000  Biopsy Hx 4/7/22- with Dr Marti- negative LPA- HGPIN right mid pzpm- HGPIN  08/07/2023 IPSS  8  QOL 2 RAMILA 19  07/03/2023 IPSS  9  QOL  3 RAMILA  22  IPSS 10 QOL 4 on 2/17/22  Prostate cancer screening: the patient and I spoke at length about prostate cancer screening, its risks and its benefits. The patient has 3 (age, PSA, race) risk factors for prostate cancer.  He understands that many men with prostate cancer will die with the disease rather than of it and we also discussed the results large multi-center American and  prostate cancer screening trials. He also understands that PSA in and of itself does not diagnose prostate cancer but only assesses risk to a certain degree. The patient understands that to definitively screen for prostate cancer, a biopsy is required and this procedure has risks, including bleeding, infection, ED and urinary retention. The patient opted to fusion biopsy.  The patient denies fevers, chills, nausea and or vomiting and no unexplained weight loss.  All pertinent laboratory, films and physician notes were reviewed.  Questionnaire results were discussed with patient.

## 2023-08-07 NOTE — ASSESSMENT
[FreeTextEntry1] : 58 yo male with elevated PSA, stable MRI, elevated PSAD 0.15 ng/ml/cc, three prior negative biopsies, + family hx CaP in father and brother, neg CYDNEY.  The prostate MRI has been reviewed with the patient (images and report).  There are no suspicious lesions on 2nd look.  Reviewing multiple studies the probability of having prostate cancer with a negative prostate MRI is ~ 20%.  However, the probability of having clinically significant disease is <5% of those positive.  A recent study from [facility] presented level 1b evidence that a MRI can help avoid patients biopsies and only misses ~ 3% of clinically significant disease.    I have discussed these details at length with the patient.  He is aware of the pro's and con's of prostate cancer screening.  Taking into account his PSA, Family History and CYDNEY findings.  He wishes at this time to avoid a biopsy and will continue to undergo PSA based screening. If the PSA continues to increase or there is increasing clinical suspicion we will repeat MR imaging of the prostate prior to any intervention, due to risks associated with the prostate biopsy. The patient understands that a prostate biopsy is still the standard of care with regards to screening and MRI is an adjunct that can help localize and target more suspicious areas.  In conclusion, he would like to hold off on the biopsy at this time.  1. MRI reviewed - On review, the prior lesions discussed on 2022 scan are still present. 2. [facility] showed 25% with elevated PSAD. Needs repeat biopsy 3. Continue alfuzosin. Had postop retention after last biopsy 5. He has been trying to conceive with his partner x 1 year. Re-discussed role of semenalysis and infertility workup.  (he has not completed SA yet but has script)  IRB Notification  The patient has been made aware of the opportunity to participate in our MR US fusion guided biopsy trial testing the next generation biopsy technology.  This is an IRB approved trial (IRB #).  He is already being consented for a standard MR US fusion guided biopsy therefore there is no change in his clinical work flow.  He has been given a copy of the consent to review before the biopsy date and given an opportunity to contact us with any further questions.  He will be consented on the day of the procedure or electronically before the biopsy.  He understands that many men with prostate cancer will die with the disease rather than of it and we also discussed the results large multi-center American and  prostate cancer screening trials. He also understands that PSA in and of itself does not diagnose prostate cancer but only assesses risk to a certain degree. The patient understands that to definitively screen for prostate cancer, a biopsy is required and this procedure has risks, including bleeding, infection, ED and urinary retention. The patient opted to move forward with the biopsy.  The patient is aware to expect hematuria x 2 weeks and upto 4 weeks of hematospermia.  There is a risk of infection albeit much lower than a transrectal approach. In some cases patients can experience erectile dysfunction but this is usually self limiting.  Any fever/chills after the biopsy the patient is to contact the office and go to the ER for an immediate evaluation. He has been given paper instructions outlining these items - which includes medications to avoid prior to surgery.  1. CBC, BMP, PSA, UA UCx. EKG  CXR 2. Medical Clearance. He is aware to stop Vit E 3. TP biopsy at [facility] 4. follow up 2 weeks after biopsy with his primary urologist or ourselves. 5. we will call with the path results once they are resulted.  Thank you very much for allowing me to assist in the care of this patient. Should you have any additional questions or concerns please do not hesitate to contact me.   Sincerely,   Morales Marti D.O. Professor of Urology and Radiology  of Urology at [facility] Director for Prostate Cancer [address] [facility], [facility] Phone: [phone]

## 2023-08-07 NOTE — ADDENDUM
[FreeTextEntry1] : I, Dr. Marti, personally performed the evaluation and management (E/M) services for this established patient who presents today with (a) new problem(s)/exacerbation of (an) existing condition(s).  That E/M includes conducting the examination, assessing all new/exacerbated conditions, and establishing a new plan of care.  Today, my ACP, Harriett Salas, was here to observe my evaluation and management services for this new problem/exacerbated condition to be followed going forward.   IRB   MR/TRUS FUSION GUIDED PROSTATE BIOPSY- AN IMPROVED WAY TO DETECT AND QUANTIFY PROSTATE CANCER  Inclusion criteria have been reviewed and it has been determined that the subject has met all inclusion criteria. Exclusion criteria have been reviewed and it has been determined that the subject does not meet any exclusion criteria.  The following was discussed during the consent process: 	The fact that the study involves research 	The study schedule and procedures involved 	The main risks of the study, and the fact that all risks may not be known at this time 	New information that may affect the subjects willingness to continue on the study will be presented as soon as it is available 	Benefits of participating 	Alternatives to participating 	Confidentiality  	Compensation for research-related injury 	Contacts for questions about the study or their rights while on the study 	The fact that the subjects participation is voluntary - they can refuse or withdraw  at any time without penalty or loss of benefits.  The subject was given ample time to ask questions prior to signing - all questions were answered to the subjects satisfaction.  Contact information for research staff was given to the subject.	 The subject has expressed his/her willingness to participate.	  Opportunity to sign the consent electronically was offered to the subject. Study team to contact the subject to assist with e-consenting though the [employer] platform.   OR    Subject will sign printed consent on day of procedure.

## 2023-08-08 LAB
ANION GAP SERPL CALC-SCNC: 12 MMOL/L
APPEARANCE: CLEAR
BACTERIA: NEGATIVE /HPF
BILIRUBIN URINE: NEGATIVE
BLOOD URINE: NEGATIVE
BUN SERPL-MCNC: 9 MG/DL
CALCIUM SERPL-MCNC: 9.7 MG/DL
CAST: 0 /LPF
CHLORIDE SERPL-SCNC: 104 MMOL/L
CO2 SERPL-SCNC: 23 MMOL/L
COLOR: YELLOW
CREAT SERPL-MCNC: 1.21 MG/DL
EGFR: 70 ML/MIN/1.73M2
EPITHELIAL CELLS: 0 /HPF
GLUCOSE QUALITATIVE U: NEGATIVE MG/DL
GLUCOSE SERPL-MCNC: 114 MG/DL
HCT VFR BLD CALC: 46.2 %
HGB BLD-MCNC: 15.4 G/DL
KETONES URINE: NEGATIVE MG/DL
LEUKOCYTE ESTERASE URINE: ABNORMAL
MCHC RBC-ENTMCNC: 31 PG
MCHC RBC-ENTMCNC: 33.3 GM/DL
MCV RBC AUTO: 93.1 FL
MICROSCOPIC-UA: NORMAL
NITRITE URINE: NEGATIVE
PH URINE: 5.5
PLATELET # BLD AUTO: 200 K/UL
POTASSIUM SERPL-SCNC: 4.2 MMOL/L
PROTEIN URINE: NEGATIVE MG/DL
RBC # BLD: 4.96 M/UL
RBC # FLD: 12.3 %
RED BLOOD CELLS URINE: 0 /HPF
SODIUM SERPL-SCNC: 139 MMOL/L
SPECIFIC GRAVITY URINE: 1.02
UROBILINOGEN URINE: 0.2 MG/DL
WBC # FLD AUTO: 4.32 K/UL
WHITE BLOOD CELLS URINE: 4 /HPF

## 2023-08-09 ENCOUNTER — OUTPATIENT (OUTPATIENT)
Dept: OUTPATIENT SERVICES | Facility: HOSPITAL | Age: 58
LOS: 1 days | End: 2023-08-09
Payer: COMMERCIAL

## 2023-08-09 DIAGNOSIS — Z90.49 ACQUIRED ABSENCE OF OTHER SPECIFIED PARTS OF DIGESTIVE TRACT: Chronic | ICD-10-CM

## 2023-08-09 DIAGNOSIS — Z98.890 OTHER SPECIFIED POSTPROCEDURAL STATES: Chronic | ICD-10-CM

## 2023-08-09 LAB — BACTERIA UR CULT: NORMAL

## 2023-08-09 PROCEDURE — 76377 3D RENDER W/INTRP POSTPROCES: CPT | Mod: 26

## 2023-09-14 ENCOUNTER — APPOINTMENT (OUTPATIENT)
Dept: UROLOGY | Facility: AMBULATORY SURGERY CENTER | Age: 58
End: 2023-09-14

## 2023-09-22 ENCOUNTER — NON-APPOINTMENT (OUTPATIENT)
Age: 58
End: 2023-09-22

## 2023-09-22 ENCOUNTER — APPOINTMENT (OUTPATIENT)
Dept: OPHTHALMOLOGY | Facility: CLINIC | Age: 58
End: 2023-09-22
Payer: COMMERCIAL

## 2023-09-22 PROCEDURE — 92012 INTRM OPH EXAM EST PATIENT: CPT

## 2023-09-22 PROCEDURE — 92083 EXTENDED VISUAL FIELD XM: CPT

## 2023-09-27 ENCOUNTER — APPOINTMENT (OUTPATIENT)
Dept: RADIOLOGY | Facility: CLINIC | Age: 58
End: 2023-09-27
Payer: COMMERCIAL

## 2023-09-27 ENCOUNTER — OUTPATIENT (OUTPATIENT)
Dept: OUTPATIENT SERVICES | Facility: HOSPITAL | Age: 58
LOS: 1 days | End: 2023-09-27

## 2023-09-27 PROCEDURE — 71046 X-RAY EXAM CHEST 2 VIEWS: CPT | Mod: 26

## 2023-10-04 ENCOUNTER — TRANSCRIPTION ENCOUNTER (OUTPATIENT)
Age: 58
End: 2023-10-04

## 2023-10-04 NOTE — ASU PATIENT PROFILE, ADULT - NSICDXPASTMEDICALHX_GEN_ALL_CORE_FT
PAST MEDICAL HISTORY:  Asthma     BPH (benign prostatic hyperplasia)     Elevated PSA     Pneumonia

## 2023-10-05 ENCOUNTER — OUTPATIENT (OUTPATIENT)
Dept: OUTPATIENT SERVICES | Facility: HOSPITAL | Age: 58
LOS: 1 days | Discharge: HOME | End: 2023-10-05
Payer: COMMERCIAL

## 2023-10-05 ENCOUNTER — TRANSCRIPTION ENCOUNTER (OUTPATIENT)
Age: 58
End: 2023-10-05

## 2023-10-05 ENCOUNTER — APPOINTMENT (OUTPATIENT)
Dept: UROLOGY | Facility: AMBULATORY SURGERY CENTER | Age: 58
End: 2023-10-05

## 2023-10-05 ENCOUNTER — RESULT REVIEW (OUTPATIENT)
Age: 58
End: 2023-10-05

## 2023-10-05 VITALS
HEART RATE: 64 BPM | TEMPERATURE: 97 F | SYSTOLIC BLOOD PRESSURE: 109 MMHG | HEIGHT: 66 IN | WEIGHT: 177.47 LBS | RESPIRATION RATE: 16 BRPM | OXYGEN SATURATION: 98 % | DIASTOLIC BLOOD PRESSURE: 75 MMHG

## 2023-10-05 VITALS
SYSTOLIC BLOOD PRESSURE: 117 MMHG | DIASTOLIC BLOOD PRESSURE: 77 MMHG | TEMPERATURE: 98 F | HEART RATE: 60 BPM | OXYGEN SATURATION: 98 % | RESPIRATION RATE: 16 BRPM

## 2023-10-05 DIAGNOSIS — Z90.49 ACQUIRED ABSENCE OF OTHER SPECIFIED PARTS OF DIGESTIVE TRACT: Chronic | ICD-10-CM

## 2023-10-05 DIAGNOSIS — Z98.890 OTHER SPECIFIED POSTPROCEDURAL STATES: Chronic | ICD-10-CM

## 2023-10-05 PROCEDURE — G0416: CPT | Mod: 26

## 2023-10-05 PROCEDURE — 55700: CPT | Mod: 22

## 2023-10-05 PROCEDURE — 88341 IMHCHEM/IMCYTCHM EA ADD ANTB: CPT | Mod: 26,59

## 2023-10-05 PROCEDURE — 88344 IMHCHEM/IMCYTCHM EA MLT ANTB: CPT | Mod: 26

## 2023-10-05 PROCEDURE — 76377 3D RENDER W/INTRP POSTPROCES: CPT | Mod: 26

## 2023-10-05 PROCEDURE — 55706 BX PRST8 NDL SAT SAMPLING: CPT | Mod: 22

## 2023-10-05 PROCEDURE — 76872 US TRANSRECTAL: CPT | Mod: 26

## 2023-10-05 PROCEDURE — 88342 IMHCHEM/IMCYTCHM 1ST ANTB: CPT | Mod: 26,59

## 2023-10-05 RX ORDER — ONDANSETRON 8 MG/1
4 TABLET, FILM COATED ORAL ONCE
Refills: 0 | Status: DISCONTINUED | OUTPATIENT
Start: 2023-10-05 | End: 2023-10-05

## 2023-10-05 RX ORDER — FLUTICASONE PROPIONATE AND SALMETEROL 50; 250 UG/1; UG/1
1 POWDER ORAL; RESPIRATORY (INHALATION)
Refills: 0 | DISCHARGE

## 2023-10-05 RX ORDER — ACETAMINOPHEN 500 MG
1000 TABLET ORAL ONCE
Refills: 0 | Status: DISCONTINUED | OUTPATIENT
Start: 2023-10-05 | End: 2023-10-05

## 2023-10-05 RX ORDER — FENTANYL CITRATE 50 UG/ML
25 INJECTION INTRAVENOUS
Refills: 0 | Status: DISCONTINUED | OUTPATIENT
Start: 2023-10-05 | End: 2023-10-05

## 2023-10-05 RX ORDER — SODIUM CHLORIDE 9 MG/ML
500 INJECTION, SOLUTION INTRAVENOUS
Refills: 0 | Status: DISCONTINUED | OUTPATIENT
Start: 2023-10-05 | End: 2023-10-05

## 2023-10-05 NOTE — BRIEF OPERATIVE NOTE - NSICDXBRIEFPROCEDURE_GEN_ALL_CORE_FT
PROCEDURES:  Biopsy of prostate using magnetic resonance imaging-ultrasound fusion guidance 05-Oct-2023 14:05:15  Umberto Branham

## 2023-10-05 NOTE — ASU DISCHARGE PLAN (ADULT/PEDIATRIC) - ASU DC SPECIAL INSTRUCTIONSFT
PROSTATE BIOPSY    GENERAL: Expect blood in the urine, stool, and ejaculate for up to two (2) months postoperatively. This is normal and to be expected after this procedure. Increase hydration to keep the urine as clear as possible.    SURGICAL WOUND: There are often lumps and bumps that can be felt in the perineum (skin between scrotum and anus) for up to two (2) months or more post operatively. These are of no concern and with time they will soften and disappear.  Any “black and blue” bruising areas will also resolve.  You may apply an ice-pack for 15 minutes out of every hour for the first 24 -36 hours to minimize pain and swelling. You may apply over the counter Bacitracin to the wound several times a day, and keep covered with over the counter gauze as necessary.    PAIN: You may have some intermittent pain for up to six (6) weeks post operatively. Pain does not signify any problem unless associated with fever, chills, or inability to void.  If you experience any fevers or chills please call immediately as this may be signs of an infection. You may take Tylenol (acetaminophen) 650-975mg and/or Motrin (ibuprofen) 400-600mg, both available over the counter, for pain every 6 hours as needed. Do not exceed 4000mg of Tylenol (acetaminophen) daily. You may alternate these medications such that you take one or the other every 3 hours for around the clock pain coverage.    BATHING: You may shower with soap and water, and pat dry the needle insertion sites in the perineum. Avoid sitting in water for prolonged periods of time for the next few days.    DIET: You may resume your regular diet and regular medication regimen.    ACTIVITY: No heavy lifting or strenuous exercise until you are evaluated at your post-operative appointment. Otherwise, you may return to your usual level of physical activity.    FOLLOW-UP: If you did not already schedule your post-operative appointment, please call your urologist to schedule and follow-up appointment.    CALL YOUR UROLOGIST IF: You have any bleeding that does not stop, inability to void >8 hours, fever over 100.4 F, chills, persistent nausea/vomiting, changes in your incision concerning for infection, or if your pain is not controlled on your discharge pain medications.

## 2023-10-05 NOTE — ASU DISCHARGE PLAN (ADULT/PEDIATRIC) - CARE PROVIDER_API CALL
Alexis Marti  Urology  [address] [facility] [place] [address]  Phone: [phone]  Fax: [phone]  Follow Up Time: 1 week

## 2023-10-05 NOTE — BRIEF OPERATIVE NOTE - OPERATION/FINDINGS
Patient placed in high dorsal lithotomy. Under MAC. Scrotum taped up to reveal perineum. Patient prepped and draped in usual clean fashion. TP biopsy conducted in usual fashion under fusion imaging. Target lesions in L Likely TZa and R Mid PZPM located and biopsy cores acquired x4 each. Remaining prostate regions biopsied in systematic fashion. At conclusion of procedure patient dressed with perineal gauze, bacitracin, and tegaderm.

## 2023-10-05 NOTE — ASU DISCHARGE PLAN (ADULT/PEDIATRIC) - NS MD DC FALL RISK RISK
For information on Fall & Injury Prevention, visit: [email or web address] OR  [email or web address] OR  https://www.cdc.gov/steadi/patient.html

## 2023-10-06 ENCOUNTER — NON-APPOINTMENT (OUTPATIENT)
Age: 58
End: 2023-10-06

## 2023-10-17 ENCOUNTER — APPOINTMENT (OUTPATIENT)
Dept: UROLOGY | Facility: CLINIC | Age: 58
End: 2023-10-17

## 2023-10-18 ENCOUNTER — NON-APPOINTMENT (OUTPATIENT)
Age: 58
End: 2023-10-18

## 2023-10-18 LAB
SURGICAL PATHOLOGY STUDY: SIGNIFICANT CHANGE UP
SURGICAL PATHOLOGY STUDY: SIGNIFICANT CHANGE UP

## 2023-11-01 ENCOUNTER — APPOINTMENT (OUTPATIENT)
Dept: UROLOGY | Facility: CLINIC | Age: 58
End: 2023-11-01
Payer: COMMERCIAL

## 2023-11-01 VITALS
TEMPERATURE: 98.2 F | OXYGEN SATURATION: 98 % | SYSTOLIC BLOOD PRESSURE: 130 MMHG | HEART RATE: 70 BPM | WEIGHT: 174 LBS | DIASTOLIC BLOOD PRESSURE: 90 MMHG | BODY MASS INDEX: 27.97 KG/M2 | HEIGHT: 66 IN

## 2023-11-01 DIAGNOSIS — N40.1 BENIGN PROSTATIC HYPERPLASIA WITH LOWER URINARY TRACT SYMPMS: ICD-10-CM

## 2023-11-01 DIAGNOSIS — N13.8 BENIGN PROSTATIC HYPERPLASIA WITH LOWER URINARY TRACT SYMPMS: ICD-10-CM

## 2023-11-01 DIAGNOSIS — R97.20 ELEVATED PROSTATE, SPECIFIC ANTIGEN [PSA]: ICD-10-CM

## 2023-11-01 DIAGNOSIS — R53.83 OTHER FATIGUE: ICD-10-CM

## 2023-11-01 PROCEDURE — 99213 OFFICE O/P EST LOW 20 MIN: CPT

## 2023-11-06 ENCOUNTER — TRANSCRIPTION ENCOUNTER (OUTPATIENT)
Age: 58
End: 2023-11-06

## 2023-11-06 LAB — TESTOST SERPL-MCNC: 234 NG/DL

## 2023-12-15 ENCOUNTER — APPOINTMENT (OUTPATIENT)
Dept: OPHTHALMOLOGY | Facility: CLINIC | Age: 58
End: 2023-12-15
Payer: COMMERCIAL

## 2023-12-15 ENCOUNTER — NON-APPOINTMENT (OUTPATIENT)
Age: 58
End: 2023-12-15

## 2023-12-15 PROCEDURE — 92083 EXTENDED VISUAL FIELD XM: CPT

## 2023-12-15 PROCEDURE — 92014 COMPRE OPH EXAM EST PT 1/>: CPT

## 2024-01-18 NOTE — PHYSICAL EXAM
[No Acute Distress] : no acute distress [No Resp Distress] : no resp distress [Clear to Auscultation Bilaterally] : clear to auscultation bilaterally [Normal Color/ Pigmentation] : normal color/ pigmentation [Oriented x3] : oriented x3 [Normal Affect] : normal affect

## 2024-01-23 ENCOUNTER — APPOINTMENT (OUTPATIENT)
Dept: PULMONOLOGY | Facility: CLINIC | Age: 59
End: 2024-01-23

## 2024-01-23 NOTE — ASSESSMENT
[FreeTextEntry1] : The patient is a 57 year old M, former smoker, with PMHx of Asthma, BPH, Glaucoma, who was referred by Dr. Laurent for cough and abnormal CT chest.  Reviewed: Chest XR 5/11/23: slight increased lung markings in the posterior basal segment of the left lower lobe CT chest 5/13/23: Multifocal regions of peribronchial parenchymal bandlike and ground-glass opacity, including within the left lower lobe lingula and left perihilar region. There is tree-in-bud branching micronodular opacity within the subpleural left lower lobe. There is a region of scarring within the right upper lobe extending from the right perihilar region. No pulmonary consolidation. No pulmonary mass. Records from PCP Dr. Laurent Labs 6/23: Asthma Profile + multiple allergens, no eosinophilia, IgE 280, Aspergillus Ag and Ab negative PFT (7/11/23): FEV 1 82, FEV1/FVC 68 , TLC 89 , DLCO 76  A/P: (1) Bronchiectasis: Patient with evidence of bronchiectasis on CT chest 5/23. Notes frequent episodes of bronchitis/PNA in childhood which may be etiology. Also with GERD which may be contributory. No hemoptysis, weight loss, sputum production or indication to treat bronchiectasis currently.  (2) Asthma, Allergic rhinitis: Currently using Atrovent sparingly. No laboratory evidence of ABPA. Asthma profile + multiple allergens. PFTs with mild obstruction. Symptoms well-controlled with Advair and Nasonex. - Continue Advair with gargle - Continue Atrovent PRN - Continue Nasonex nasal spray BID (fluticasone not covered by insurance)

## 2024-01-23 NOTE — REASON FOR VISIT
[Follow-Up] : a follow-up visit [Abnormal CXR/ Chest CT] : an abnormal CXR/ chest CT [TextBox_44] : Dr. Laurent

## 2024-01-23 NOTE — HISTORY OF PRESENT ILLNESS
[Former] : former [TextBox_4] : The patient is a 57 year old M, former smoker, with PMHx of Asthma, BPH, Glaucoma, who was referred by Dr. Laurent for cough and abnormal CT chest. Cough is dry, not worse in morning/night. Notes that in the past 6 months he's had 4 URIs for which he was prescribed 4 rounds of antibiotics as well as one course of prednisone (during most recent URI x 5 weeks ago). During URI's has sinus congestion, cough, as well as worsening of his asthma with SOB/wheezing. Taking Advair 250-50 1 puff BID and Atrovent. Now using Atrovent infrequently. Endorses PND, nasal congestion, as well as seasonal allergies. No pets. No known mold exposures. Has GERD. States he had bronchitis/ PNA frequently in his childhood/20s.  7/11/23: Cough improved with use of Nasonex nasal spray. Asthma symptoms well-controlled with Advair.  [TextBox_11] : 1 [TextBox_13] : 1 [YearQuit] : 2022

## 2024-04-08 ENCOUNTER — APPOINTMENT (OUTPATIENT)
Dept: PULMONOLOGY | Facility: CLINIC | Age: 59
End: 2024-04-08
Payer: COMMERCIAL

## 2024-04-08 VITALS
OXYGEN SATURATION: 98 % | HEART RATE: 63 BPM | BODY MASS INDEX: 28.77 KG/M2 | SYSTOLIC BLOOD PRESSURE: 107 MMHG | HEIGHT: 66 IN | DIASTOLIC BLOOD PRESSURE: 74 MMHG | TEMPERATURE: 97.6 F | WEIGHT: 179 LBS

## 2024-04-08 VITALS
WEIGHT: 179 LBS | HEART RATE: 63 BPM | OXYGEN SATURATION: 98 % | TEMPERATURE: 97.3 F | HEIGHT: 66 IN | DIASTOLIC BLOOD PRESSURE: 74 MMHG | SYSTOLIC BLOOD PRESSURE: 107 MMHG | BODY MASS INDEX: 28.77 KG/M2

## 2024-04-08 PROCEDURE — 99214 OFFICE O/P EST MOD 30 MIN: CPT

## 2024-04-09 NOTE — CONSULT LETTER
[Dear  ___] : Dear  [unfilled], [Courtesy Letter:] : I had the pleasure of seeing your patient, [unfilled], in my office today. [Please see my note below.] : Please see my note below. [Consult Closing:] : Thank you very much for allowing me to participate in the care of this patient.  If you have any questions, please do not hesitate to contact me. [Sincerely,] : Sincerely, [FreeTextEntry3] : Elicia Park MD  [employer] at [facility] Pulmonary, Critical Care, and Sleep Medicine

## 2024-04-09 NOTE — PHYSICAL EXAM
[No Acute Distress] : no acute distress [No Resp Distress] : no resp distress [Clear to Auscultation Bilaterally] : clear to auscultation bilaterally [Oriented x3] : oriented x3 [Normal Affect] : normal affect [Normal Appearance] : normal appearance [No Acc Muscle Use] : no acc muscle use [Normal Gait] : normal gait

## 2024-04-09 NOTE — HISTORY OF PRESENT ILLNESS
[Former] : former [TextBox_4] : The patient is a 57 year old M, former smoker, with PMHx of Asthma, BPH, Glaucoma, who was referred by Dr. Laurent for cough and abnormal CT chest. Cough is dry, not worse in morning/night. Notes that in the past 6 months he's had 4 URIs for which he was prescribed 4 rounds of antibiotics as well as one course of prednisone (during most recent URI x 5 weeks ago). During URI's has sinus congestion, cough, as well as worsening of his asthma with SOB/wheezing. Taking Advair 250-50 1 puff BID and Atrovent. Now using Atrovent infrequently. Endorses PND, nasal congestion, as well as seasonal allergies. No pets. No known mold exposures. Has GERD. States he had bronchitis/ PNA frequently in his childhood/20s.  7/11/23 Cough improved with use of Nasonex nasal spray. Asthma symptoms well-controlled with Advair.  4/8/2024 Has been well. Has not been adherent to Advair or nasal sprays. No exacerbations in over 6 months.  [TextBox_11] : 1 [TextBox_13] : 1 [YearQuit] : 2022 [ESS] : 3

## 2024-04-09 NOTE — ASSESSMENT
[FreeTextEntry1] : The patient is a 58-year-old M, former smoker, with PMHx of Asthma, BPH, Glaucoma, who was referred by Dr. Laurent for cough and abnormal CT chest.  Reviewed: Chest XR 5/11/23: slight increased lung markings in the posterior basal segment of the left lower lobe  CT chest 5/13/23: Multifocal regions of peribronchial parenchymal bandlike and ground-glass opacity, including within the left lower lobe lingula and left perihilar region. There is tree-in-bud branching micronodular opacity within the subpleural left lower lobe. There is a region of scarring within the right upper lobe extending from the right perihilar region. No pulmonary consolidation. No pulmonary mass.  Records from PCP Dr. Laurent  Labs 6/23: Asthma Profile + multiple allergens, no eosinophilia, IgE 280, Aspergillus Ag and Ab negative  PFT (7/11/23): FEV 1 82, FEV1/FVC 68 , TLC 89 , DLCO 76 , REV 1  A/P:  (1) Bronchiectasis: Patient with evidence of bronchiectasis on CT chest 5/23. Notes frequent episodes of bronchitis/PNA in childhood which may be etiology. Also with GERD which may be contributory. No hemoptysis, weight loss, sputum production or indication to treat bronchiectasis currently.  (2) Asthma, Allergic rhinitis:  Using Advair and nasal sprays sparingly. No laboratory evidence of ABPA. Asthma profile + multiple allergens.  - Resume Advair with gargle if symptoms recur - Continue Atrovent PRN  Follow up in 6 months, with PFTs.

## 2024-05-03 ENCOUNTER — OUTPATIENT (OUTPATIENT)
Dept: OUTPATIENT SERVICES | Facility: HOSPITAL | Age: 59
LOS: 1 days | End: 2024-05-03
Payer: COMMERCIAL

## 2024-05-03 ENCOUNTER — APPOINTMENT (OUTPATIENT)
Dept: MRI IMAGING | Facility: HOSPITAL | Age: 59
End: 2024-05-03
Payer: COMMERCIAL

## 2024-05-03 DIAGNOSIS — Z90.49 ACQUIRED ABSENCE OF OTHER SPECIFIED PARTS OF DIGESTIVE TRACT: Chronic | ICD-10-CM

## 2024-05-03 DIAGNOSIS — Z98.890 OTHER SPECIFIED POSTPROCEDURAL STATES: Chronic | ICD-10-CM

## 2024-05-03 PROCEDURE — 75561 CARDIAC MRI FOR MORPH W/DYE: CPT | Mod: 26

## 2024-05-03 PROCEDURE — 75561 CARDIAC MRI FOR MORPH W/DYE: CPT

## 2024-05-03 PROCEDURE — A9577: CPT

## 2024-05-06 ENCOUNTER — NON-APPOINTMENT (OUTPATIENT)
Age: 59
End: 2024-05-06

## 2024-05-06 ENCOUNTER — APPOINTMENT (OUTPATIENT)
Dept: OPHTHALMOLOGY | Facility: CLINIC | Age: 59
End: 2024-05-06
Payer: COMMERCIAL

## 2024-05-06 PROCEDURE — 92012 INTRM OPH EXAM EST PATIENT: CPT

## 2024-05-06 PROCEDURE — 92083 EXTENDED VISUAL FIELD XM: CPT

## 2024-05-14 ENCOUNTER — NON-APPOINTMENT (OUTPATIENT)
Age: 59
End: 2024-05-14

## 2024-05-15 ENCOUNTER — APPOINTMENT (OUTPATIENT)
Dept: PULMONOLOGY | Facility: CLINIC | Age: 59
End: 2024-05-15
Payer: COMMERCIAL

## 2024-05-15 VITALS
BODY MASS INDEX: 28.12 KG/M2 | HEIGHT: 66 IN | TEMPERATURE: 97.8 F | DIASTOLIC BLOOD PRESSURE: 73 MMHG | OXYGEN SATURATION: 98 % | WEIGHT: 175 LBS | HEART RATE: 63 BPM | SYSTOLIC BLOOD PRESSURE: 106 MMHG

## 2024-05-15 DIAGNOSIS — J47.9 BRONCHIECTASIS, UNCOMPLICATED: ICD-10-CM

## 2024-05-15 DIAGNOSIS — J45.909 UNSPECIFIED ASTHMA, UNCOMPLICATED: ICD-10-CM

## 2024-05-15 PROCEDURE — 94618 PULMONARY STRESS TESTING: CPT

## 2024-05-15 PROCEDURE — 94726 PLETHYSMOGRAPHY LUNG VOLUMES: CPT

## 2024-05-15 PROCEDURE — 94729 DIFFUSING CAPACITY: CPT

## 2024-05-15 PROCEDURE — 99214 OFFICE O/P EST MOD 30 MIN: CPT | Mod: 25

## 2024-05-15 PROCEDURE — ZZZZZ: CPT

## 2024-05-15 PROCEDURE — 94060 EVALUATION OF WHEEZING: CPT

## 2024-05-16 ENCOUNTER — APPOINTMENT (OUTPATIENT)
Dept: UROLOGY | Facility: CLINIC | Age: 59
End: 2024-05-16
Payer: COMMERCIAL

## 2024-05-16 VITALS
SYSTOLIC BLOOD PRESSURE: 119 MMHG | DIASTOLIC BLOOD PRESSURE: 83 MMHG | OXYGEN SATURATION: 97 % | HEART RATE: 58 BPM | TEMPERATURE: 98.1 F

## 2024-05-16 PROBLEM — J47.9 BRONCHIECTASIS: Status: ACTIVE | Noted: 2023-06-23

## 2024-05-16 PROBLEM — J45.909 ASTHMA: Status: ACTIVE | Noted: 2022-03-04

## 2024-05-16 PROCEDURE — 99213 OFFICE O/P EST LOW 20 MIN: CPT

## 2024-05-16 RX ORDER — ALFUZOSIN HYDROCHLORIDE 10 MG/1
10 TABLET, EXTENDED RELEASE ORAL
Qty: 90 | Refills: 3 | Status: ACTIVE | COMMUNITY
Start: 2022-03-04 | End: 1900-01-01

## 2024-05-16 NOTE — HISTORY OF PRESENT ILLNESS
[FreeTextEntry1] : Dr. Marti  CC: Elevated PSA  59 yo male with elevated PSA, stable MRI, elevated PSAD, four negative biopsies, + family hx CaP in father and brother,  and elevated select MDx. Negative fusion biopsy 10/5/23 (4th biopsy)- atypia/ASAP in right mid pzpm target   2023- 7.11 ng/mL   2023-9.05 ng/mL 2022- 5.27 ng/mL 2022- 4.48 ng/mL   Prostate MRI 2023 PIRADs 2 46.5mL gland   Currently on alfuzosin and is working well Does still experience nocturia x 4  Erections are ok.  Surgical Hx: appendectomy , left knee meniscus surgery  Social Hx: 1 cigarette/day, rare ETOH use, college , plays guitar, no children,  Family Hx: Brother and father with CaP, father  from natural causes last year.

## 2024-05-16 NOTE — ASSESSMENT
[FreeTextEntry1] : Reviewed: Chest XR 5/11/23: slight increased lung markings in the posterior basal segment of the left lower lobe  CT chest 5/13/23: Multifocal regions of peribronchial parenchymal bandlike and ground-glass opacity, including within the left lower lobe lingula and left perihilar region. There is tree-in-bud branching micronodular opacity within the subpleural left lower lobe. There is a region of scarring within the right upper lobe extending from the right perihilar region. No pulmonary consolidation. No pulmonary mass.  Records from PCP Dr. Laurent  Labs 6/23: Asthma Profile + multiple allergens, no eosinophilia, IgE 280, Aspergillus Ag and Ab negative  PFT (7/11/23): FEV 1 82, FEV1/FVC 68 , TLC 89 , DLCO 76 , REV 1  PFT (5/2024: FEV1 86, FEV1/FVC 69, TLC 85, DLCO 78  6MWT (5/2024): 518 meters walked; no desat  The patient is a 58-year-old M, former smoker, with PMHx of Asthma, BPH, Glaucoma, who was initially referred by Dr. Laurent for cough and abnormal CT chest.   (1) Bronchiectasis: Patient with evidence of bronchiectasis on CT chest 5/23. Notes frequent episodes of bronchitis/PNA in childhood which may be etiology. Also with GERD which may be contributory. No hemoptysis, weight loss, sputum production or indication to treat bronchiectasis currently.  (2) Asthma, Allergic rhinitis: Had prior success with Advair and nasal sprays.  No laboratory evidence of ABPA. Asthma profile + multiple allergens. PFTs today with stable mild obstructive defect.  - Resume Advair with gargle if symptoms recur - Continue Atrovent PRN  Follow up in 6 months

## 2024-05-16 NOTE — CONSULT LETTER
[Dear  ___] : Dear  [unfilled], [Courtesy Letter:] : I had the pleasure of seeing your patient, [unfilled], in my office today. [Please see my note below.] : Please see my note below. [Consult Closing:] : Thank you very much for allowing me to participate in the care of this patient.  If you have any questions, please do not hesitate to contact me. [Sincerely,] : Sincerely, [FreeTextEntry3] : Elicia Park MD  [employer] [facility], Critical Care, [facility] Sleep Medicine

## 2024-05-16 NOTE — PHYSICAL EXAM
[No Acute Distress] : no acute distress [Normal Appearance] : normal appearance [No Resp Distress] : no resp distress [Clear to Auscultation Bilaterally] : clear to auscultation bilaterally [Normal Gait] : normal gait [Oriented x3] : oriented x3 [Normal Affect] : normal affect

## 2024-05-16 NOTE — ASSESSMENT
[FreeTextEntry1] : Diagnosis: Elevated PSA, BPH/LUTS  Plan: Continue with alfuzosin PSA today History of 4 negative biopsies which is reassuring, consider repeat MRI in future  RTC 1 year pending PSA    Khang Chamorro MD, FACS, FRCS  of Urology [facility] Director of Laparoscopic and Robotic Surgery [facility] Director of Urology, [facility] Professor of Urology   (Office) [phone] (Cell)  [phone] [email or web address]     I performed history/review of imaging, discussed treatment plan with patient, agree with above transcription by JEANETTE.  The total amount of time I have personally spent preparing for this visit, reviewing the patient's test results, obtaining external history, ordering tests/medications, documenting clinical information, communicating with and counseling the patient/family and/or caregiver(s), and spent face to face with the patient explaining the above was minutes =20

## 2024-05-17 ENCOUNTER — NON-APPOINTMENT (OUTPATIENT)
Age: 59
End: 2024-05-17

## 2024-05-17 LAB — PSA SERPL-MCNC: 6.61 NG/ML

## 2024-07-01 ENCOUNTER — NON-APPOINTMENT (OUTPATIENT)
Age: 59
End: 2024-07-01

## 2024-07-02 ENCOUNTER — APPOINTMENT (OUTPATIENT)
Dept: ORTHOPEDIC SURGERY | Facility: CLINIC | Age: 59
End: 2024-07-02

## 2024-07-02 VITALS — HEIGHT: 66 IN | BODY MASS INDEX: 28.12 KG/M2 | WEIGHT: 175 LBS

## 2024-07-02 DIAGNOSIS — M17.12 UNILATERAL PRIMARY OSTEOARTHRITIS, LEFT KNEE: ICD-10-CM

## 2024-07-02 PROCEDURE — 20610 DRAIN/INJ JOINT/BURSA W/O US: CPT | Mod: LT

## 2024-07-02 PROCEDURE — 73562 X-RAY EXAM OF KNEE 3: CPT | Mod: LT

## 2024-07-02 PROCEDURE — 99204 OFFICE O/P NEW MOD 45 MIN: CPT | Mod: 25

## 2024-07-02 RX ORDER — NABUMETONE 500 MG/1
500 TABLET, FILM COATED ORAL
Qty: 60 | Refills: 0 | Status: ACTIVE | COMMUNITY
Start: 2024-07-02 | End: 1900-01-01

## 2024-08-20 NOTE — ASU PATIENT PROFILE, ADULT - NS PREOP UNDERSTANDS INFO
Onset: gradual   Wife wife's voicemail.     leave valuables/jewelry/piercings/contacts at home, make sure to have escort 18+, bring photo ID + insurance card, no solid foods after midnight, water/apple juice/gatorade until 0945/yes

## 2024-09-09 ENCOUNTER — APPOINTMENT (OUTPATIENT)
Dept: OPHTHALMOLOGY | Facility: CLINIC | Age: 59
End: 2024-09-09
Payer: COMMERCIAL

## 2024-09-09 ENCOUNTER — NON-APPOINTMENT (OUTPATIENT)
Age: 59
End: 2024-09-09

## 2024-09-09 PROCEDURE — 92014 COMPRE OPH EXAM EST PT 1/>: CPT

## 2024-09-09 PROCEDURE — 92083 EXTENDED VISUAL FIELD XM: CPT

## 2024-11-21 ENCOUNTER — NON-APPOINTMENT (OUTPATIENT)
Age: 59
End: 2024-11-21

## 2024-11-22 ENCOUNTER — APPOINTMENT (OUTPATIENT)
Dept: UROLOGY | Facility: CLINIC | Age: 59
End: 2024-11-22
Payer: COMMERCIAL

## 2024-11-22 VITALS
SYSTOLIC BLOOD PRESSURE: 92 MMHG | HEIGHT: 66 IN | BODY MASS INDEX: 28.12 KG/M2 | DIASTOLIC BLOOD PRESSURE: 59 MMHG | WEIGHT: 175 LBS | HEART RATE: 80 BPM | TEMPERATURE: 97.6 F

## 2024-11-22 DIAGNOSIS — R97.20 ELEVATED PROSTATE, SPECIFIC ANTIGEN [PSA]: ICD-10-CM

## 2024-11-22 DIAGNOSIS — N40.1 BENIGN PROSTATIC HYPERPLASIA WITH LOWER URINARY TRACT SYMPMS: ICD-10-CM

## 2024-11-22 DIAGNOSIS — N13.8 BENIGN PROSTATIC HYPERPLASIA WITH LOWER URINARY TRACT SYMPMS: ICD-10-CM

## 2024-11-22 PROCEDURE — 99214 OFFICE O/P EST MOD 30 MIN: CPT

## 2024-11-22 PROCEDURE — G2211 COMPLEX E/M VISIT ADD ON: CPT | Mod: NC

## 2024-11-25 ENCOUNTER — NON-APPOINTMENT (OUTPATIENT)
Age: 59
End: 2024-11-25

## 2024-11-25 ENCOUNTER — APPOINTMENT (OUTPATIENT)
Dept: OTOLARYNGOLOGY | Facility: CLINIC | Age: 59
End: 2024-11-25
Payer: COMMERCIAL

## 2024-11-25 VITALS — HEIGHT: 66 IN

## 2024-11-25 DIAGNOSIS — H90.3 SENSORINEURAL HEARING LOSS, BILATERAL: ICD-10-CM

## 2024-11-25 DIAGNOSIS — H61.23 IMPACTED CERUMEN, BILATERAL: ICD-10-CM

## 2024-11-25 DIAGNOSIS — Z86.69 PERSONAL HISTORY OF OTHER DISEASES OF THE NERVOUS SYSTEM AND SENSE ORGANS: ICD-10-CM

## 2024-11-25 DIAGNOSIS — Z87.09 PERSONAL HISTORY OF OTHER DISEASES OF THE RESPIRATORY SYSTEM: ICD-10-CM

## 2024-11-25 DIAGNOSIS — J33.9 NASAL POLYP, UNSPECIFIED: ICD-10-CM

## 2024-11-25 DIAGNOSIS — H93.299 OTHER ABNORMAL AUDITORY PERCEPTIONS, UNSPECIFIED EAR: ICD-10-CM

## 2024-11-25 LAB — PSA SERPL-MCNC: 10.6 NG/ML

## 2024-11-25 PROCEDURE — 99214 OFFICE O/P EST MOD 30 MIN: CPT | Mod: 25

## 2024-11-25 PROCEDURE — 92557 COMPREHENSIVE HEARING TEST: CPT

## 2024-11-25 PROCEDURE — 31231 NASAL ENDOSCOPY DX: CPT

## 2024-11-25 PROCEDURE — G0268 REMOVAL OF IMPACTED WAX MD: CPT

## 2024-11-25 PROCEDURE — 92550 TYMPANOMETRY & REFLEX THRESH: CPT | Mod: 52

## 2024-11-25 RX ORDER — ALFUZOSIN HYDROCHLORIDE 10 MG/1
TABLET, EXTENDED RELEASE ORAL
Refills: 0 | Status: ACTIVE | COMMUNITY

## 2024-11-25 RX ORDER — FLUTICASONE PROPIONATE 50 UG/1
50 SPRAY, METERED NASAL
Qty: 2 | Refills: 3 | Status: ACTIVE | COMMUNITY
Start: 2024-11-25 | End: 1900-01-01

## 2024-11-26 PROBLEM — H93.299 ABNORMAL AUDITORY PERCEPTION: Status: ACTIVE | Noted: 2024-11-26

## 2024-12-09 ENCOUNTER — APPOINTMENT (OUTPATIENT)
Dept: OTOLARYNGOLOGY | Facility: CLINIC | Age: 59
End: 2024-12-09
Payer: COMMERCIAL

## 2024-12-09 VITALS — BODY MASS INDEX: 28.12 KG/M2 | WEIGHT: 175 LBS | HEIGHT: 66 IN

## 2024-12-09 DIAGNOSIS — J34.2 DEVIATED NASAL SEPTUM: ICD-10-CM

## 2024-12-09 DIAGNOSIS — J34.3 HYPERTROPHY OF NASAL TURBINATES: ICD-10-CM

## 2024-12-09 DIAGNOSIS — J33.9 NASAL POLYP, UNSPECIFIED: ICD-10-CM

## 2024-12-09 DIAGNOSIS — R09.82 POSTNASAL DRIP: ICD-10-CM

## 2024-12-09 DIAGNOSIS — J45.909 UNSPECIFIED ASTHMA, UNCOMPLICATED: ICD-10-CM

## 2024-12-09 DIAGNOSIS — J30.89 OTHER ALLERGIC RHINITIS: ICD-10-CM

## 2024-12-09 DIAGNOSIS — H90.3 SENSORINEURAL HEARING LOSS, BILATERAL: ICD-10-CM

## 2024-12-09 PROCEDURE — 99213 OFFICE O/P EST LOW 20 MIN: CPT | Mod: 25

## 2024-12-09 PROCEDURE — 31231 NASAL ENDOSCOPY DX: CPT

## 2025-01-13 ENCOUNTER — APPOINTMENT (OUTPATIENT)
Dept: OPHTHALMOLOGY | Facility: CLINIC | Age: 60
End: 2025-01-13
Payer: COMMERCIAL

## 2025-01-13 ENCOUNTER — NON-APPOINTMENT (OUTPATIENT)
Age: 60
End: 2025-01-13

## 2025-01-13 PROCEDURE — 92012 INTRM OPH EXAM EST PATIENT: CPT

## 2025-01-13 PROCEDURE — 92083 EXTENDED VISUAL FIELD XM: CPT

## 2025-05-08 ENCOUNTER — APPOINTMENT (OUTPATIENT)
Dept: PULMONOLOGY | Facility: CLINIC | Age: 60
End: 2025-05-08

## 2025-05-12 ENCOUNTER — APPOINTMENT (OUTPATIENT)
Dept: OPHTHALMOLOGY | Facility: CLINIC | Age: 60
End: 2025-05-12
Payer: COMMERCIAL

## 2025-05-12 ENCOUNTER — NON-APPOINTMENT (OUTPATIENT)
Age: 60
End: 2025-05-12

## 2025-05-12 PROCEDURE — 92014 COMPRE OPH EXAM EST PT 1/>: CPT

## 2025-05-12 PROCEDURE — 92083 EXTENDED VISUAL FIELD XM: CPT

## 2025-05-27 ENCOUNTER — NON-APPOINTMENT (OUTPATIENT)
Age: 60
End: 2025-05-27

## 2025-05-27 ENCOUNTER — APPOINTMENT (OUTPATIENT)
Dept: UROLOGY | Facility: CLINIC | Age: 60
End: 2025-05-27
Payer: COMMERCIAL

## 2025-05-27 VITALS
SYSTOLIC BLOOD PRESSURE: 114 MMHG | TEMPERATURE: 97 F | WEIGHT: 170 LBS | BODY MASS INDEX: 27.32 KG/M2 | HEIGHT: 66 IN | OXYGEN SATURATION: 97 % | HEART RATE: 51 BPM | DIASTOLIC BLOOD PRESSURE: 76 MMHG

## 2025-05-27 PROCEDURE — 99214 OFFICE O/P EST MOD 30 MIN: CPT

## 2025-05-28 LAB — PSA SERPL-MCNC: 7.25 NG/ML

## 2025-07-15 ENCOUNTER — RESULT REVIEW (OUTPATIENT)
Age: 60
End: 2025-07-15

## 2025-07-15 ENCOUNTER — APPOINTMENT (OUTPATIENT)
Dept: MRI IMAGING | Facility: CLINIC | Age: 60
End: 2025-07-15
Payer: COMMERCIAL

## 2025-07-15 ENCOUNTER — OUTPATIENT (OUTPATIENT)
Dept: OUTPATIENT SERVICES | Facility: HOSPITAL | Age: 60
LOS: 1 days | End: 2025-07-15

## 2025-07-15 DIAGNOSIS — Z98.890 OTHER SPECIFIED POSTPROCEDURAL STATES: Chronic | ICD-10-CM

## 2025-07-15 DIAGNOSIS — Z90.49 ACQUIRED ABSENCE OF OTHER SPECIFIED PARTS OF DIGESTIVE TRACT: Chronic | ICD-10-CM

## 2025-07-15 PROCEDURE — 72197 MRI PELVIS W/O & W/DYE: CPT | Mod: 26

## 2025-07-15 PROCEDURE — 76498P: CUSTOM | Mod: 26

## 2025-07-22 ENCOUNTER — NON-APPOINTMENT (OUTPATIENT)
Age: 60
End: 2025-07-22

## 2025-09-08 ENCOUNTER — APPOINTMENT (OUTPATIENT)
Dept: OPHTHALMOLOGY | Facility: CLINIC | Age: 60
End: 2025-09-08
Payer: COMMERCIAL

## 2025-09-08 ENCOUNTER — NON-APPOINTMENT (OUTPATIENT)
Age: 60
End: 2025-09-08

## 2025-09-08 PROCEDURE — 92012 INTRM OPH EXAM EST PATIENT: CPT

## 2025-09-08 PROCEDURE — 92083 EXTENDED VISUAL FIELD XM: CPT

## (undated) DEVICE — PACK ANTERIOR SEGMENT

## (undated) DEVICE — DRSG TELFA 3 X 8

## (undated) DEVICE — SUT SILK 7-0 18" TG140-8

## (undated) DEVICE — ELCTR ERASER BI-P BVL 45DEG 18G

## (undated) DEVICE — SUT PROLENE 3-0 36" RB-1

## (undated) DEVICE — PREP CHLORAPREP HI-LITE ORANGE 26ML

## (undated) DEVICE — GRID BRACHYTHERAPY EZ 18G

## (undated) DEVICE — GLV 7.5 PROTEXIS (WHITE)

## (undated) DEVICE — PETRI DISH MED 3.5"

## (undated) DEVICE — BALLOON ENDOCAVITY 2X14CM

## (undated) DEVICE — PLASTIC SOLUTION BOWL 160Z

## (undated) DEVICE — NDL BIOPSY CHIBA 22G X 20CM

## (undated) DEVICE — CANNULA IRR ANT CHAMBER 30G

## (undated) DEVICE — SYR LUER LOK 50CC

## (undated) DEVICE — DRAPE TOWEL BLUE 17" X 24"

## (undated) DEVICE — NDL HYPO REGULAR BEVEL 25G X 1.5" (BLUE)

## (undated) DEVICE — SUT PROLENE 6-0 24" CC

## (undated) DEVICE — DRAPE MEDIUM SHEET 44" X 70"

## (undated) DEVICE — SUT VICRYL 8-0 12" TG140-8 DA

## (undated) DEVICE — PREP BETADINE SPONGE STICKS

## (undated) DEVICE — SYR CATH TIP 2 OZ

## (undated) DEVICE — APPLICATOR COTTON TIP 3" STERILE

## (undated) DEVICE — DRAPE MAYO STAND 23"

## (undated) DEVICE — GLV 8 PROTEXIS (WHITE)

## (undated) DEVICE — NDL MAX CORE 18G X 25CM

## (undated) DEVICE — SUT ETHILON 9-0 6" VAS100-4

## (undated) DEVICE — ELCTR BIPOLAR CORD 12FT

## (undated) DEVICE — SUT VICRYL 10-0 12" CS160-6 DA

## (undated) DEVICE — SYR LUER LOK 10CC

## (undated) DEVICE — CATARACT KIT

## (undated) DEVICE — Device

## (undated) DEVICE — NDL COUNTER DBL BLADEGUARD

## (undated) DEVICE — KNIFE ALCON I-KNIFE II STAB KNIFE STANDARD 3MM (GREEN)

## (undated) DEVICE — SUT ETHILON 8-0 12" TG175-8

## (undated) DEVICE — CANNULA BD & CO SUBTENONS

## (undated) DEVICE — SPEAR CELLULOSE 40410

## (undated) DEVICE — DRAPE MICROSCOPE KNOB COVER SMALL (2 PCS)

## (undated) DEVICE — SUT VICRYL 7-0 18" TG160-8 DA

## (undated) DEVICE — NDL HYPO REGULAR BEVEL 30G X .5"

## (undated) DEVICE — SYR LUER LOK 5CC